# Patient Record
Sex: MALE | Race: WHITE | NOT HISPANIC OR LATINO | Employment: FULL TIME | URBAN - METROPOLITAN AREA
[De-identification: names, ages, dates, MRNs, and addresses within clinical notes are randomized per-mention and may not be internally consistent; named-entity substitution may affect disease eponyms.]

---

## 2017-10-11 ENCOUNTER — OFFICE VISIT - RIVER FALLS (OUTPATIENT)
Dept: FAMILY MEDICINE | Facility: CLINIC | Age: 42
End: 2017-10-11

## 2018-02-12 ENCOUNTER — OFFICE VISIT - RIVER FALLS (OUTPATIENT)
Dept: FAMILY MEDICINE | Facility: CLINIC | Age: 43
End: 2018-02-12

## 2018-02-12 ASSESSMENT — MIFFLIN-ST. JEOR: SCORE: 1946.52

## 2018-03-29 ENCOUNTER — OFFICE VISIT - RIVER FALLS (OUTPATIENT)
Dept: FAMILY MEDICINE | Facility: CLINIC | Age: 43
End: 2018-03-29

## 2018-03-29 ASSESSMENT — MIFFLIN-ST. JEOR: SCORE: 1949.24

## 2018-09-18 ENCOUNTER — OFFICE VISIT - RIVER FALLS (OUTPATIENT)
Dept: FAMILY MEDICINE | Facility: CLINIC | Age: 43
End: 2018-09-18

## 2018-09-18 ASSESSMENT — MIFFLIN-ST. JEOR: SCORE: 1959.22

## 2018-09-21 ENCOUNTER — AMBULATORY - RIVER FALLS (OUTPATIENT)
Dept: FAMILY MEDICINE | Facility: CLINIC | Age: 43
End: 2018-09-21

## 2018-09-22 LAB
CHOLEST SERPL-MCNC: 227 MG/DL
CHOLEST/HDLC SERPL: 5.2 {RATIO}
GLUCOSE BLD-MCNC: 94 MG/DL (ref 65–99)
HDLC SERPL-MCNC: 44 MG/DL
LDLC SERPL CALC-MCNC: 144 MG/DL
NONHDLC SERPL-MCNC: 183 MG/DL
TRIGL SERPL-MCNC: 241 MG/DL

## 2019-04-30 ENCOUNTER — OFFICE VISIT - RIVER FALLS (OUTPATIENT)
Dept: FAMILY MEDICINE | Facility: CLINIC | Age: 44
End: 2019-04-30

## 2019-04-30 ASSESSMENT — MIFFLIN-ST. JEOR: SCORE: 1952.87

## 2020-01-15 ENCOUNTER — OFFICE VISIT - RIVER FALLS (OUTPATIENT)
Dept: FAMILY MEDICINE | Facility: CLINIC | Age: 45
End: 2020-01-15

## 2020-01-15 ASSESSMENT — MIFFLIN-ST. JEOR: SCORE: 1948.33

## 2020-01-16 ENCOUNTER — COMMUNICATION - RIVER FALLS (OUTPATIENT)
Dept: FAMILY MEDICINE | Facility: CLINIC | Age: 45
End: 2020-01-16

## 2020-01-16 LAB
CHOLEST SERPL-MCNC: 229 MG/DL
CHOLEST/HDLC SERPL: 4.7 {RATIO}
GLUCOSE BLD-MCNC: 86 MG/DL (ref 65–99)
HDLC SERPL-MCNC: 49 MG/DL
LDLC SERPL CALC-MCNC: 141 MG/DL
NONHDLC SERPL-MCNC: 180 MG/DL
TRIGL SERPL-MCNC: 251 MG/DL

## 2021-02-17 ENCOUNTER — AMBULATORY - RIVER FALLS (OUTPATIENT)
Dept: FAMILY MEDICINE | Facility: CLINIC | Age: 46
End: 2021-02-17

## 2021-02-18 ENCOUNTER — COMMUNICATION - RIVER FALLS (OUTPATIENT)
Dept: FAMILY MEDICINE | Facility: CLINIC | Age: 46
End: 2021-02-18

## 2021-02-18 LAB
CHOLEST SERPL-MCNC: 183 MG/DL
CHOLEST/HDLC SERPL: 4.7 {RATIO}
GLUCOSE BLD-MCNC: 94 MG/DL (ref 65–99)
HDLC SERPL-MCNC: 39 MG/DL
LDLC SERPL CALC-MCNC: 110 MG/DL
NONHDLC SERPL-MCNC: 144 MG/DL
TRIGL SERPL-MCNC: 217 MG/DL

## 2021-02-24 ENCOUNTER — OFFICE VISIT - RIVER FALLS (OUTPATIENT)
Dept: FAMILY MEDICINE | Facility: CLINIC | Age: 46
End: 2021-02-24

## 2021-02-24 ASSESSMENT — MIFFLIN-ST. JEOR: SCORE: 1922.26

## 2021-04-21 ENCOUNTER — AMBULATORY - RIVER FALLS (OUTPATIENT)
Dept: FAMILY MEDICINE | Facility: CLINIC | Age: 46
End: 2021-04-21

## 2021-05-19 ENCOUNTER — AMBULATORY - RIVER FALLS (OUTPATIENT)
Dept: FAMILY MEDICINE | Facility: CLINIC | Age: 46
End: 2021-05-19

## 2021-07-05 ENCOUNTER — OFFICE VISIT - RIVER FALLS (OUTPATIENT)
Dept: FAMILY MEDICINE | Facility: CLINIC | Age: 46
End: 2021-07-05

## 2021-07-05 ASSESSMENT — MIFFLIN-ST. JEOR: SCORE: 1945.39

## 2021-07-09 ENCOUNTER — COMMUNICATION - RIVER FALLS (OUTPATIENT)
Dept: FAMILY MEDICINE | Facility: CLINIC | Age: 46
End: 2021-07-09

## 2021-07-09 LAB
ERYTHROCYTE [DISTWIDTH] IN BLOOD BY AUTOMATED COUNT: 12.8 % (ref 11–15)
HCT VFR BLD AUTO: 50.1 % (ref 38.5–50)
HGB BLD-MCNC: 16.3 GM/DL (ref 13.2–17.1)
IRON SATURATION: 22 (ref 20–48)
IRON SERPL-MCNC: 80 MCG/DL (ref 50–180)
MCH RBC QN AUTO: 29.2 PG (ref 27–33)
MCHC RBC AUTO-ENTMCNC: 32.5 GM/DL (ref 32–36)
MCV RBC AUTO: 89.8 FL (ref 80–100)
PLATELET # BLD AUTO: 216 10*3/UL (ref 140–400)
PMV BLD: 11.4 FL (ref 7.5–12.5)
RBC # BLD AUTO: 5.58 10*6/UL (ref 4.2–5.8)
TESTOSTERONE FREE: 64 PG/ML (ref 35–155)
TESTOSTERONE TOTAL: 284 NG/DL (ref 250–1100)
TIBC - QUEST: 370 (ref 250–425)
TSH SERPL DL<=0.005 MIU/L-ACNC: 2.57 MIU/L (ref 0.4–4.5)
WBC # BLD AUTO: 6.1 10*3/UL (ref 3.8–10.8)

## 2021-07-16 ENCOUNTER — COMMUNICATION - RIVER FALLS (OUTPATIENT)
Dept: FAMILY MEDICINE | Facility: CLINIC | Age: 46
End: 2021-07-16

## 2022-02-11 VITALS
HEART RATE: 76 BPM | TEMPERATURE: 97.4 F | BODY MASS INDEX: 31.18 KG/M2 | SYSTOLIC BLOOD PRESSURE: 128 MMHG | WEIGHT: 230.2 LBS | DIASTOLIC BLOOD PRESSURE: 84 MMHG | HEIGHT: 72 IN

## 2022-02-11 VITALS
HEART RATE: 66 BPM | HEIGHT: 72 IN | WEIGHT: 231 LBS | SYSTOLIC BLOOD PRESSURE: 124 MMHG | BODY MASS INDEX: 31.29 KG/M2 | DIASTOLIC BLOOD PRESSURE: 80 MMHG | TEMPERATURE: 97.8 F

## 2022-02-11 VITALS
BODY MASS INDEX: 31.48 KG/M2 | SYSTOLIC BLOOD PRESSURE: 122 MMHG | TEMPERATURE: 98.3 F | DIASTOLIC BLOOD PRESSURE: 80 MMHG | HEART RATE: 80 BPM | HEIGHT: 72 IN | WEIGHT: 232.4 LBS

## 2022-02-11 VITALS
BODY MASS INDEX: 29.03 KG/M2 | TEMPERATURE: 96.6 F | DIASTOLIC BLOOD PRESSURE: 74 MMHG | HEART RATE: 80 BPM | OXYGEN SATURATION: 98 % | SYSTOLIC BLOOD PRESSURE: 102 MMHG | HEIGHT: 73 IN | WEIGHT: 219 LBS

## 2022-02-11 VITALS
HEART RATE: 78 BPM | WEIGHT: 230 LBS | SYSTOLIC BLOOD PRESSURE: 122 MMHG | DIASTOLIC BLOOD PRESSURE: 70 MMHG | HEIGHT: 72 IN | BODY MASS INDEX: 31.15 KG/M2 | OXYGEN SATURATION: 96 %

## 2022-02-11 VITALS
OXYGEN SATURATION: 98 % | SYSTOLIC BLOOD PRESSURE: 104 MMHG | TEMPERATURE: 98.3 F | BODY MASS INDEX: 29.7 KG/M2 | HEART RATE: 70 BPM | WEIGHT: 224.1 LBS | HEIGHT: 73 IN | DIASTOLIC BLOOD PRESSURE: 60 MMHG

## 2022-02-11 VITALS
SYSTOLIC BLOOD PRESSURE: 114 MMHG | DIASTOLIC BLOOD PRESSURE: 82 MMHG | HEART RATE: 79 BPM | BODY MASS INDEX: 31.3 KG/M2 | OXYGEN SATURATION: 97 % | WEIGHT: 227.6 LBS

## 2022-02-11 VITALS
BODY MASS INDEX: 31.1 KG/M2 | TEMPERATURE: 97.6 F | DIASTOLIC BLOOD PRESSURE: 74 MMHG | HEART RATE: 76 BPM | WEIGHT: 229.6 LBS | SYSTOLIC BLOOD PRESSURE: 132 MMHG | HEIGHT: 72 IN

## 2022-02-15 NOTE — NURSING NOTE
CAGE Assessment Entered On:  2/24/2021 4:01 PM CST    Performed On:  2/24/2021 4:01 PM CST by Monica Jeffers CMA               Assessment   Have you ever felt you should cut down on your drinking :   No   Have people annoyed you by criticizing your drinking :   No   Have you ever felt bad or guilty about your drinking :   No   Have you ever taken a drink first thing in the morning to steady your nerves or get rid of a hangover (Eye-opener) :   No   CAGE Score :   0    Monica Jeffers CMA - 2/24/2021 4:01 PM CST

## 2022-02-15 NOTE — NURSING NOTE
CAGE Assessment Entered On:  4/30/2019 4:44 PM CDT    Performed On:  4/30/2019 4:44 PM CDT by Fadia Ray CMA               Assessment   Have you ever felt you should cut down on your drinking :   No   Have people annoyed you by criticizing your drinking :   No   Have you ever felt bad or guilty about your drinking :   No   Have you ever taken a drink first thing in the morning to steady your nerves or get rid of a hangover (Eye-opener) :   No   CAGE Score :   0    Fadia Ray CMA - 4/30/2019 4:44 PM CDT

## 2022-02-15 NOTE — PROGRESS NOTES
Patient:   JOYCE AYERS            MRN: [id number]            FIN: [id number]               Age:   41 years     Sex:  Male     :  1975   Associated Diagnoses:   Moderate recurrent major depression; Insomnia   Author:   Kaylah Ruiz MD      Chief Complaint   10/11/2017 2:47 PM CDT   Patient here for med check: Clonazepam, effexor.      Interval History   Patient here to follow up on depression. Medications are working well. Has been a little more anxious. Using clonazepam more frequently.   No side effects from medications noted. Due for refils.      Health Status   Allergies:    Allergic Reactions (All)  No known allergies   Medications:  (Selected)   Prescriptions  Prescribed  clonazePAM 1 mg oral tablet: See Instructions, Instructions: TAKE 1 TABLET BY MOUTH TWICE DAILY AS NEEDED FOR ANXIETY, # 30 tab(s), 1 Refill(s), Type: Soft Stop, Pharmacy: [facility] #[id number]  traZODone 50 mg oral tablet: See Instructions, Instructions: TAKE ONE TABLET BY MOUTH EVERY DAY, # 90 tab(s), 1 Refill(s), Type: Soft Stop, Pharmacy: [facility] #[id number]  venlafaxine 150 mg oral capsule, extended release: See Instructions, Instructions: TAKE 1 CAPSULE DAILY ALONG WITH 75MG TO TOTAL 225MG., # 90 cap(s), Type: Soft Stop, Pharmacy: [facility] #[id number]  venlafaxine 75 mg oral capsule, extended release: See Instructions, Instructions: TAKE ONE CAPSULE BY MOUTH EVERY DAY ALONG WITH 150MG TO TOTAL 225MG, # 90 cap(s), Type: Soft Stop, Pharmacy: [facility] #[id number]   Problem list:    All Problems  Tobacco abuse / ICD-9-.1 / Confirmed  Obesity / SNOMED CT 7812507514 / Probable  Moderate recurrent major depression / SNOMED CT 47329044 / Confirmed  Insomnia / SNOMED CT 688161377 / Confirmed      Histories   Past Medical History:    Active  Tobacco abuse (ICD-9-.1): Onset in the month of 3/2010 at 34 years  Insomnia (SNOMED CT 622658886)  Moderate recurrent major depression (SNOMED CT 28522859)   Family History:    CA  - Lung cancer  Grandfather (M)  Diabetes mellitus type 1  Grandfather (M)  Stroke  Grandmother (M)     Procedure history:    Rotator cuff repair ([id number]) in the month of 11/2009 at 33 Years.   Social History:        Alcohol Assessment: Current                     Comments:                      08/22/2016 - Megan Ibarra LPN                     2 x's per week      Tobacco Assessment: Current                     Comments:                      08/22/2016 - Megan Ibarra LPN                     Also, occasional regular cigarette                       09/30/2014 - Megan Ibarra LPN                     E-cigarette      Employment and Education Assessment            Employed      Home and Environment Assessment            Marital status:  (Living together).  Spouse/Partner name: Cherry.  3 children.  Living situation:               Home/Independent.      Nutrition and Health Assessment            Type of diet: Regular.      Exercise and Physical Activity Assessment: Occasional exercise      Sexual Assessment            Sexual orientation: Heterosexual.        Physical Examination   Vital Signs   10/11/2017 2:47 PM CDT Peripheral Pulse Rate 79 bpm    Systolic Blood Pressure 114 mmHg    Diastolic Blood Pressure 82 mmHg    Mean Arterial Pressure 93 mmHg    Oxygen Saturation 97 %      Measurements from flowsheet : Measurements   10/11/2017 2:47 PM CDT   Weight Measured - Standard                227.6 lb     General:  Alert and oriented, No acute distress.    Eye:  Pupils are equal, round and reactive to light, Normal conjunctiva.    HENT:  Normocephalic, Oral mucosa is moist, No pharyngeal erythema.    Neck:  Supple, Non-tender, No lymphadenopathy.    Respiratory:  Lungs are clear to auscultation.    Cardiovascular:  Normal rate, Regular rhythm.       Impression and Plan   Diagnosis     Moderate recurrent major depression (MWE01-NX F33.1).     Insomnia (OWK20-YD G47.00).     Orders     Orders (Selected)    Prescriptions  Prescribed  clonazePAM 1 mg oral tablet: See Instructions, Instructions: TAKE 1 TABLET BY MOUTH TWICE DAILY AS NEEDED FOR ANXIETY, # 30 tab(s), 2 Refill(s), Type: Soft Stop, Pharmacy: [facility] #[id number], TAKE 1 TABLET BY MOUTH TWICE DAILY AS NEEDED FOR ANXIETY  traZODone 50 mg oral tablet: 1 tab(s) ( 50 mg ), po, hs, # 90 tab(s), 3 Refill(s), Type: Soft Stop, Pharmacy: [facility] #[id number], 1 tab(s) po hs  venlafaxine 150 mg oral capsule, extended release: See Instructions, Instructions: TAKE 1 CAPSULE DAILY ALONG WITH 75MG TO TOTAL 225MG., # 90 cap(s), 3 Refill(s), Type: Soft Stop, Pharmacy: [facility] #[id number], TAKE 1 CAPSULE DAILY ALONG WITH 75MG TO TOTAL 225MG.  venlafaxine 75 mg oral capsule, extended release: See Instructions, Instructions: TAKE ONE CAPSULE BY MOUTH EVERY DAY ALONG WITH 150MG TO TOTAL 225MG, # 90 cap(s), 3 Refill(s), Type: Soft Stop, Pharmacy: [facility] #[id number], TAKE ONE CAPSULE BY MOUTH EVERY DAY ALONG WITH 150MG TO TOTAL 225MG.

## 2022-02-15 NOTE — NURSING NOTE
Comprehensive Intake Entered On:  1/15/2020 2:26 PM CST    Performed On:  1/15/2020 2:22 PM CST by Fadia Ray CMA               Summary   Chief Complaint :   Physical and Depression/Anxiety med check   Menstrual Status :   N/A   Weight Measured :   230.0 lb(Converted to: 230 lb 0 oz, 104.33 kg)    Height Measured :   71.5 in(Converted to: 5 ft 11 in, 181.61 cm)    Body Mass Index :   31.63 kg/m2 (HI)    Body Surface Area :   2.29 m2   Systolic Blood Pressure :   122 mmHg   Diastolic Blood Pressure :   70 mmHg   Mean Arterial Pressure :   87 mmHg   Peripheral Pulse Rate :   78 bpm   Oxygen Saturation :   96 %   Fadia Ray CMA - 1/15/2020 2:22 PM CST   Health Status   Allergies Verified? :   Yes   Medication History Verified? :   Yes   Immunizations Current :   Yes   Medical History Verified? :   Yes   Pre-Visit Planning Status :   Completed   Tobacco Use? :   Former smoker   Fadia Ray CMA - 1/15/2020 2:22 PM CST   Consents   Consent for Immunization Exchange :   Consent Granted   Consent for Immunizations to Providers :   Consent Granted   Fadia Ray CMA - 1/15/2020 2:22 PM CST   Meds / Allergies   (As Of: 1/15/2020 2:26:54 PM CST)   Allergies (Active)   No Known Medication Allergies  Estimated Onset Date:   Unspecified ; Created By:   Amanda Brown CMA; Reaction Status:   Active ; Category:   Drug ; Substance:   No Known Medication Allergies ; Type:   Allergy ; Updated By:   Amanda Brown CMA; Reviewed Date:   1/15/2020 2:25 PM CST        Medication List   (As Of: 1/15/2020 2:26:54 PM CST)   Prescription/Discharge Order    clonazePAM  :   clonazePAM ; Status:   Prescribed ; Ordered As Mnemonic:   clonazePAM 1 mg oral tablet ; Simple Display Line:   1 mg, 1 tab(s), Oral, bid, PRN: for anxiety, 30 tab(s), 0 Refill(s) ; Ordering Provider:   Kaylah Ruiz MD; Catalog Code:   clonazePAM ; Order Dt/Tm:   12/2/2019 4:19:35 PM CST          traZODone  :   traZODone ; Status:   Prescribed ; Ordered As Mnemonic:    traZODone 50 mg oral tablet ; Simple Display Line:   1-2 tabs, po, hs, 120 tab(s), 3 Refill(s) ; Ordering Provider:   Kaylah Ruiz MD; Catalog Code:   traZODone ; Order Dt/Tm:   4/30/2019 4:41:02 PM CDT          venlafaxine  :   venlafaxine ; Status:   Prescribed ; Ordered As Mnemonic:   venlafaxine 150 mg oral capsule, extended release ; Simple Display Line:   150 mg, 1 cap(s), Oral, daily, ALONG WITH 75MG TO TOTAL 225MG., 90 cap(s), 3 Refill(s) ; Ordering Provider:   Kaylah Ruiz MD; Catalog Code:   venlafaxine ; Order Dt/Tm:   4/30/2019 4:41:00 PM CDT          venlafaxine  :   venlafaxine ; Status:   Prescribed ; Ordered As Mnemonic:   venlafaxine 75 mg oral capsule, extended release ; Simple Display Line:   75 mg, 1 cap(s), Oral, daily, ALONG WITH 150MG TO TOTAL 225MG, 90 cap(s), 3 Refill(s) ; Ordering Provider:   Kaylah Ruiz MD; Catalog Code:   venlafaxine ; Order Dt/Tm:   4/30/2019 4:41:01 PM CDT

## 2022-02-15 NOTE — NURSING NOTE
Depression Screening Entered On:  1/15/2020 3:43 PM CST    Performed On:  1/15/2020 3:43 PM CST by Fadia Ray CMA               Depression Screening   Little Interest - Pleasure in Activities :   Not at all   Feeling Down, Depressed, Hopeless :   Not at all   Initial Depression Screen Score :   0    Trouble Falling or Staying Asleep :   Not at all   Feeling Tired or Little Energy :   Several days   Poor Appetite or Overeating :   Not at all   Feeling Bad About Yourself :   Not at all   Trouble Concentrating :   Not at all   Moving or Speaking Slowly :   Not at all   Thoughts Better Off Dead or Hurting Self :   Not at all   Detailed Depression Screen Score :   1    Total Depression Screen Score :   1    DARÍO Difficulty with Work, Home, Others :   Somewhat difficult   Fadia Ray CMA - 1/15/2020 3:43 PM CST

## 2022-02-15 NOTE — PROGRESS NOTES
Patient:   JOYCE AYERS            MRN: [id number]            FIN: [id number]               Age:   45 years     Sex:  Male     :  1975   Associated Diagnoses:   Fatigue   Author:   Kaylah Ruiz MD      Chief Complaint   2021 8:21 AM CDT     c/o fatigue.      History of Present Illness   patient with fatigue for past six weeks  has noticed decrease in energy and also sleepiness  usually worst midday but can happen at any time  no joint pain or muscle pain, no headaches, no shortness of breath or chest pain  history of low testosterone, has not been rechecked since 2015      Health Status   Allergies:    Allergic Reactions (All)  No Known Medication Allergies  Canceled/Inactive Reactions (All)  No known allergies   Medications:  (Selected)   Prescriptions  Prescribed  clonazePAM 1 mg oral tablet: = 1 tab(s) ( 1 mg ), Oral, bid, PRN: for anxiety, # 30 tab(s), 5 Refill(s), Type: Soft Stop, [place] [facility] [facility] [place], 1 tab(s) Oral bid,PRN:for anxiety, 73, in, 21 7:11:00 CST, Height Chris...  traZODone 50 mg oral tablet: = 1.5 tab(s) ( 75 mg ), Oral, qhs, # 135 tab(s), 3 Refill(s), Type: Maintenance, Pharm[place] [facility] [facility] [place], 1.5 tab(s) Oral qhs, 73, in, 21 7:11:00 CST, Height Measured, 219, lb, 21 7:...  venlafaxine 150 mg oral capsule, extended release: = 1 cap(s) ( 150 mg ), Oral, daily, Instructions: take along with 75mg daily, # 90 cap(s), 3 Refill(s), Type: Maintenance, Pharm[place] [facility] [place], 1 cap(s) Oral daily,Instr:take along with 75mg...  venlafaxine 75 mg oral capsule, extended release: = 1 cap(s), Oral, daily, # 90 cap(s), 3 Refill(s), Type: Maintenance, Pharm[place] [facility] [place] [place], 1 cap(s) Oral daily, 73, in, 21 7:11:00 CST, Height Measured, 219, lb,  02/24/21 7:11:00 CST,...   Problem list:    All Problems  Anxiety / SNOMED CT 59964573 / Confirmed  Insomnia / SNOMED CT 377876301 / Confirmed  Moderate recurrent major depression / SNOMED CT 53179922 / Confirmed  Obesity / SNOMED CT 7143274764 / Probable  Tobacco use / SNOMED CT 296402025 / Confirmed      Histories   Family History:    CA - Lung cancer  Grandfather (M)  Diabetes mellitus type 1  Grandfather (M)  Stroke  Grandmother (M)     Procedure history:    Rotator cuff repair ([id number]) in the month of 11/2009 at 33 Years.      Physical Examination   Vital Signs   7/5/2021 8:21 AM CDT Temperature Tympanic 98.3 DegF    Peripheral Pulse Rate 70 bpm    HR Method Manual    Systolic Blood Pressure 104 mmHg    Diastolic Blood Pressure 60 mmHg    Mean Arterial Pressure 75 mmHg    BP Site Right arm    BP Method Manual    Oxygen Saturation 98 %      Measurements from flowsheet : Measurements   7/5/2021 8:21 AM CDT Height Measured - Standard 73 in    Weight Measured - Standard 224.1 lb    BSA 2.29 m2    Body Mass Index 29.56 kg/m2  HI      General:  Alert and oriented, No acute distress.    Psychiatric:  Cooperative, Appropriate mood & affect.       Impression and Plan   Diagnosis     Fatigue (OBN10-PC R53.83).     Course:  doubt JAYE, cardiac, infectious. Will check labs as ordered.    Orders     Orders (Selected)   Outpatient Orders  Ordered (In Transit)  CBC (h/h, RBC, indices, WBC, Plt)* ([facility]): Specimen Type: Blood, Collection Date: 07/05/21 8:39:00 CDT  Iron, Total and Total Iron Binding Capacity* ([facility]): Specimen Type: Serum, Collection Date: 07/05/21 8:39:00 CDT  TSH* ([facility]): Specimen Type: Serum, Collection Date: 07/05/21 8:39:00 CDT  Testosterone, Free (Dialysis) and Total (LC/MS/MS)* ([facility]): Specimen Type: Serum, Collection Date: 07/05/21 8:39:00 CDT.

## 2022-02-15 NOTE — PROGRESS NOTES
Patient:   JOYCE AYERS            MRN: [id number]            FIN: [id number]               Age:   42 years     Sex:  Male     :  1975   Associated Diagnoses:   Moderate recurrent major depression; Insomnia; Injury of tendon of elbow   Author:   Kaylah Ruiz MD      Chief Complaint   3/29/2018 3:05 PM CDT    Depression med check; c/o L elbow pain x yesterday      Interval History   1. medication check, current regimen working well, using clonazepm about 3-4 times a week, no side effects noted, would like to continue current regimen  2. left elbow pain yesterday, was pulling up boat and using crank when he felt a sudden pop near his left elbow, felt pain in upper forearm and lower upper arm, tender, was swollen, a little better today, has normal ROM, worried about torn muscle or tendon       Health Status   Allergies:    Allergic Reactions (All)  No Known Medication Allergies  Canceled/Inactive Reactions (All)  No known allergies   Medications:  (Selected)   Prescriptions  Prescribed  clonazePAM 1 mg oral tablet: See Instructions, Instructions: TAKE 1 TABLET BY MOUTH TWICE DAILY AS NEEDED FOR ANXIETY, # 30 tab(s), 0 Refill(s), Type: Soft Stop, Pharmacy: [facility] #[id number], TAKE 1 TABLET BY MOUTH TWICE DAILY AS NEEDED FOR ANXIETY  traZODone 50 mg oral tablet: 1 tab(s) ( 50 mg ), po, hs, # 90 tab(s), 3 Refill(s), Type: Soft Stop, Pharmacy: [facility] #[id number], 1 tab(s) po hs  venlafaxine 150 mg oral capsule, extended release: See Instructions, Instructions: TAKE 1 CAPSULE DAILY ALONG WITH 75MG TO TOTAL 225MG., # 90 cap(s), 3 Refill(s), Type: Soft Stop, Pharmacy: [facility] #[id number], TAKE 1 CAPSULE DAILY ALONG WITH 75MG TO TOTAL 225MG.  venlafaxine 75 mg oral capsule, extended release: See Instructions, Instructions: TAKE ONE CAPSULE BY MOUTH EVERY DAY ALONG WITH 150MG TO TOTAL 225MG, # 90 cap(s), 3 Refill(s), Type: Soft Stop, Pharmacy: [facility] #[id number], TAKE ONE CAPSULE BY MOUTH EVERY DAY ALONG  WITH 150MG TO TOTAL 225MG   Problem list:    All Problems  Insomnia / SNOMED CT 084534565 / Confirmed  Moderate recurrent major depression / SNOMED CT 03468648 / Confirmed  Obesity / SNOMED CT 6615576406 / Probable  Tobacco abuse / ICD-9-.1 / Confirmed      Histories   Past Medical History:    Active  Tobacco abuse (ICD-9-.1): Onset in the month of 3/2010 at 34 years  Insomnia (SNOMED CT 753971222)  Moderate recurrent major depression (SNOMED CT 01246857)   Family History:    CA - Lung cancer  Grandfather (M)  Diabetes mellitus type 1  Grandfather (M)  Stroke  Grandmother (M)     Procedure history:    Rotator cuff repair ([id number]) in the month of 11/2009 at 33 Years.   Social History:        Alcohol Assessment: Current                     Comments:                      08/22/2016 - Megan Ibarra LPN                     2 x's per week      Tobacco Assessment: Current                     Comments:                      08/22/2016 - Megan Ibarra LPN                     Also, occasional regular cigarette                       09/30/2014 - Megan Ibarra LPN                     E-cigarette      Employment and Education Assessment            Employed      Home and Environment Assessment            Marital status:  (Living together).  Spouse/Partner name: Cherry.  3 children.  Living situation:               Home/Independent.      Nutrition and Health Assessment            Type of diet: Regular.      Exercise and Physical Activity Assessment: Occasional exercise      Sexual Assessment            Sexual orientation: Heterosexual.        Physical Examination   Vital Signs   3/29/2018 3:05 PM CDT Temperature Tympanic 97.4 DegF  LOW    Peripheral Pulse Rate 76 bpm    Pulse Site Radial artery    HR Method Manual    Systolic Blood Pressure 128 mmHg    Diastolic Blood Pressure 84 mmHg  HI    Mean Arterial Pressure 99 mmHg    BP Site Right arm    BP Method Manual      Measurements from flowsheet :  Measurements   3/29/2018 3:05 PM CDT Height Measured - Standard 71.5 in    Weight Measured - Standard 230.2 lb    BSA 2.29 m2    Body Mass Index 31.66 kg/m2  HI      Musculoskeletal:  left arm with full ROM, tender at lower bicep and near antecubital space, no swelling, no mass.       Impression and Plan   Diagnosis     Moderate recurrent major depression (QRW66-ZE F33.1).     Insomnia (PVU94-IA G47.00).     Course:  Well controlled.    Plan:  follow up when refills of clonazepam are needed.    Orders     Orders (Selected)   Prescriptions  Prescribed  clonazePAM 1 mg oral tablet: See Instructions, Instructions: TAKE 1 TABLET BY MOUTH TWICE DAILY AS NEEDED FOR ANXIETY, # 30 tab(s), 2 Refill(s), Type: Soft Stop, Pharmacy: [facility] #[id number], TAKE 1 TABLET BY MOUTH TWICE DAILY AS NEEDED FOR ANXIETY  traZODone 50 mg oral tablet: 1 tab(s) ( 50 mg ), po, hs, # 90 tab(s), 3 Refill(s), Type: Soft Stop, Pharmacy: [facility] #[id number], 1 tab(s) po hs  venlafaxine 150 mg oral capsule, extended release: See Instructions, Instructions: TAKE 1 CAPSULE DAILY ALONG WITH 75MG TO TOTAL 225MG., # 90 cap(s), 3 Refill(s), Type: Soft Stop, Pharmacy: [facility] #[id number], TAKE 1 CAPSULE DAILY ALONG WITH 75MG TO TOTAL 225MG.  venlafaxine 75 mg oral capsule, extended release: See Instructions, Instructions: TAKE ONE CAPSULE BY MOUTH EVERY DAY ALONG WITH 150MG TO TOTAL 225MG, # 90 cap(s), 3 Refill(s), Type: Soft Stop, Pharmacy: [facility] #[id number], TAKE ONE CAPSULE BY MOUTH EVERY DAY ALONG WITH 150MG TO TOTAL 225MG.     Diagnosis     Injury of tendon of elbow (KIJ93-ZF S46.909A).     Plan:  Patient will monitor symptoms. If not improving, should have MRI to rule out full rupture. Will call if not resolving..

## 2022-02-15 NOTE — TELEPHONE ENCOUNTER
---------------------  From: Shruthi Andrade MA (Phone Messages Pool ([id number]_[place] Girard))   To: Kaylah Ruiz MD;     Sent: 12/2/2019 3:33:10 PM CST  Subject: Phone Note: Clonazepam     PCP:   AG      Time of Call:  3:20       Person Calling:  Yefri  Phone number:  [phone]    Returned call at: _    Note:   Patient called wondering if you could send refills in for his Clonazepam. Last filled 4/30/19 #30 with 5 refills. Or would you like him to come in to get this refilled?    [facility]     Last office visit and reason:  4/30/19    Transferred to: [facility]  ** Submitted: **  Order:clonazePAM (clonazePAM 1 mg oral tablet)  1 tab(s)  Oral  bid  Qty:  30 tab(s)        Refills:  0          PRN  for anxiety      Route To Pharmacy - [facility] #[id number]    Signed by Kaylah Ruiz MD  12/2/2019 4:19:00 PMOK for one refill. Needs visit.---------------------  From: Kaylah Ruiz MD   To: [employer] ([id number]_JAS Girard);     Sent: 12/2/2019 4:20:37 PM CST  Subject: RE: Phone Note: ClonazepamCalled and let patient know.

## 2022-02-15 NOTE — NURSING NOTE
Generalized Anxiety Disorder Screening Entered On:  2/24/2021 4:01 PM CST    Performed On:  2/24/2021 4:01 PM CST by Monica Jeffers CMA               Generalized Anxiety Disorder Screening   DARÍO Nervous, Anxious On Edge :   Not at all   DARÍO Control Worrying B :   Not at all   DARÍO Worrying Too Much :   Not at all   DARÍO Trouble Relaxing :   Not at all   DARÍO Restless :   Not at all   DARÍO Easily Annoyed/Irritable :   Not at all   DARÍO Afraid :   Not at all   DARÍO Total Screening Score :   0    DARÍO Difficulty with Work, Home, Others :   Not difficult at all   Monica Jeffers CMA - 2/24/2021 4:01 PM CST

## 2022-02-15 NOTE — PROGRESS NOTES
Patient:   JOYCE AYERS            MRN: [id number]            FIN: [id number]               Age:   42 years     Sex:  Male     :  1975   Associated Diagnoses:   Diabetes mellitus screening; Lipid screening; Well adult exam   Author:   Kaylah Ruiz MD      Visit Information      Date of Service: 2018 01:46 pm  Performing Location: [facility]  Encounter#: [id number]      Primary Care Provider (PCP):  Kaylah Ruiz MD    NPI# [id number]      Referring Provider:  Kaylah Ruiz MD    NPI# [id number]      Chief Complaint   2018 2:00 PM CDT    Physical; had labs done for life insurance policy has questions      History of Present Illness             The patient presents for a general exam.  The patient's general health status is described as good.  The patient's diet is described as balanced.  Exercise: occasional.  Associated symptoms consist of none.  Medical encounters: none.  Compliance problems: none.  Complaint: Life insurance blood testing showed elevated triglycerdes, elevated total cholesterol. Not fasting for those labs .  Additional pertinent history: seat belt use, tobacco use tobacco use: e-cigarettes, occasional and occasional alcohol use.        Review of Systems   Constitutional:  Negative.    Eye:  Negative.    Ear/Nose/Mouth/Throat:  Tinnutis, right ear.    Respiratory:  Negative.    Cardiovascular:  Negative.    Gastrointestinal:  Negative.    Genitourinary:  Negative.    Neurologic:  Negative.    Psychiatric:  Negative.       Health Status   Allergies:    Allergic Reactions (Selected)  No Known Medication Allergies   Medications:  (Selected)   Prescriptions  Prescribed  clonazePAM 1 mg oral tablet: See Instructions, Instructions: TAKE 1 TABLET BY MOUTH TWICE DAILY AS NEEDED FOR ANXIETY, # 30 tab(s), 1 Refill(s), Type: Soft Stop, Pharmacy: [facility] #[id number]  traZODone 50 mg oral tablet: 1 tab(s) ( 50 mg ), po, hs, # 90 tab(s), 3 Refill(s), Type: Soft Stop,  Pharmacy: [facility] #[id number], 1 tab(s) po hs  venlafaxine 150 mg oral capsule, extended release: 1 cap(s) ( 150 mg ), Oral, daily, Instructions: ALONG WITH 75MG TO TOTAL 225MG., # 90 cap(s), 3 Refill(s), Type: Soft Stop, Pharmacy: [facility] #[id number]  venlafaxine 75 mg oral capsule, extended release: 1 cap(s) ( 75 mg ), Oral, daily, Instructions: ALONG WITH 150MG TO TOTAL 225MG, # 90 cap(s), 3 Refill(s), Type: Soft Stop, Pharmacy: [facility] #[id number],    Medications          *denotes recorded medication          clonazePAM 1 mg oral tablet: See Instructions, TAKE 1 TABLET BY MOUTH TWICE DAILY AS NEEDED FOR ANXIETY, 30 tab(s), 1 Refill(s).          traZODone 50 mg oral tablet: 50 mg, 1 tab(s), po, hs, 90 tab(s), 3 Refill(s).          venlafaxine 75 mg oral capsule, extended release: 75 mg, 1 cap(s), Oral, daily, ALONG WITH 150MG TO TOTAL 225MG, 90 cap(s), 3 Refill(s).          venlafaxine 150 mg oral capsule, extended release: 150 mg, 1 cap(s), Oral, daily, ALONG WITH 75MG TO TOTAL 225MG., 90 cap(s), 3 Refill(s).     Problem list:    All Problems  Insomnia / SNOMED CT 521812244 / Confirmed  Moderate recurrent major depression / SNOMED CT 88015839 / Confirmed  Obesity / SNOMED CT 0166249558 / Probable  Tobacco abuse / ICD-9-.1 / Confirmed      Histories   Past Medical History:    Active  Tobacco abuse (305.1): Onset in the month of 3/2010 at 34 years  Insomnia (786823861)  Moderate recurrent major depression (37907704)   Family History:    CA - Lung cancer  Grandfather (M)  Diabetes mellitus type 1  Grandfather (M)  Stroke  Grandmother (M)     Procedure history:    Rotator cuff repair (246370049) in the month of 11/2009 at 33 Years.   Social History:        Alcohol Assessment: Current                     Comments:                      08/22/2016 - Megan Ibarra LPN                     2 x's per week      Tobacco Assessment: Current                     Comments:                      08/22/2016 -  Stacey ASMITAMegan PERAZA                     Also, occasional regular cigarette                       09/30/2014 - Megan Ibarra LPN                     E-cigarette      Employment and Education Assessment            Employed      Home and Environment Assessment            Marital status:  (Living together).  Spouse/Partner name: Cherry.  3 children.  Living situation:               Home/Independent.      Nutrition and Health Assessment            Type of diet: Regular.      Exercise and Physical Activity Assessment: Occasional exercise      Sexual Assessment            Sexual orientation: Heterosexual.        Physical Examination   Vital Signs   9/18/2018 2:00 PM CDT Temperature Tympanic 98.3 DegF    Peripheral Pulse Rate 80 bpm    Pulse Site Radial artery    HR Method Manual    Systolic Blood Pressure 122 mmHg    Diastolic Blood Pressure 80 mmHg    Mean Arterial Pressure 94 mmHg    BP Site Left arm    BP Method Manual      Measurements from flowsheet : Measurements   9/18/2018 2:00 PM CDT Height Measured - Standard 71.5 in    Weight Measured - Standard 232.4 lb    BSA 2.3 m2    Body Mass Index 31.96 kg/m2  HI      General:  Alert and oriented, No acute distress.    Eye:  Pupils are equal, round and reactive to light, Normal conjunctiva.    Neck:  Supple.    Respiratory:  Lungs are clear to auscultation, Respirations are non-labored.    Cardiovascular:  Normal rate, Regular rhythm, No murmur.    Gastrointestinal:  Soft, Non-tender, Non-distended.    Neurologic:  Alert, Oriented.    Psychiatric:  Cooperative, Appropriate mood & affect.       Impression and Plan   Diagnosis     Diabetes mellitus screening (DLI63-FN Z13.1).     Lipid screening (PNO01-TL Z13.220).     Well adult exam (INI76-MS Z00.00).     Orders     Orders (Selected)   Outpatient Orders  Ordered  Return to Clinic (Request): RFV: fasting lab visit: lipid, glucose, Return in 1 week  Prescriptions  Prescribed  clonazePAM 1 mg oral tablet: See  Instructions, Instructions: TAKE 1 TABLET BY MOUTH TWICE DAILY AS NEEDED FOR ANXIETY, # 30 tab(s), 2 Refill(s), Type: Soft Stop, Pharmacy: [facility] #[id number], TAKE 1 TABLET BY MOUTH TWICE DAILY AS NEEDED FOR ANXIETY.     Patient was seen with student Geo Gaines MS4 and history and exam confirmed. Agree that documentation reflects findings and plan.  Kaylah uRiz MD

## 2022-02-15 NOTE — PROGRESS NOTES
Patient:   JOYCE AYERS            MRN: [id number]            FIN: [id number]               Age:   44 years     Sex:  Male     :  1975   Associated Diagnoses:   Well adult exam; Lipid screening; Screening for diabetes mellitus; Moderate recurrent major depression; Anxiety; Insomnia   Author:   Kaylah Ruiz MD      Chief Complaint   1/15/2020 2:22 PM CST    Physical and Depression/Anxiety med check      Well Adult History   Well Adult History             The patient presents for well adult exam.  The patient's general health status is described as good.  The patient's diet is described as balanced.  Exercise: occasional, had joined gym over the past couple of months but has not been often due to busy family schedule.  Additional pertinent history: no family hx of colon cancer so screening due at 49.     Also due for follow up on anxiety  Overall remains well controlled. Continues to use clonazepam as needed. Venlafaxine working well. Trazodone continues to be helpful for insomnia. No side effects noted      Review of Systems   ROS reviewed as documented in chart      Health Status   Allergies:    Allergic Reactions (All)  No Known Medication Allergies  Canceled/Inactive Reactions (All)  No known allergies   Medications:  (Selected)   Prescriptions  Prescribed  clonazePAM 1 mg oral tablet: = 1 tab(s) ( 1 mg ), Oral, bid, PRN: for anxiety, # 30 tab(s), 5 Refill(s), Type: Soft Stop, Pharmacy: [facility] , 1 tab(s) Oral bid,PRN:for anxiety  traZODone 50 mg oral tablet: = 1.5 tab(s) ( 75 mg ), po, hs, # 135 tab(s), 3 Refill(s), Type: Soft Stop, Pharmacy: [facility] , patient will let you know when needs to fill, 1.5 tab(s) Oral hs  venlafaxine 150 mg oral capsule, extended release: = 1 cap(s) ( 150 mg ), Oral, daily, Instructions: ALONG WITH 75MG TO TOTAL 225MG., # 90 cap(s), 3 Refill(s), Type: Soft Stop, Pharmacy: [facility] , patient  will let you know when needs to fill, 1 cap(s) Oral daily,Instr:ALONG...  venlafaxine 75 mg oral capsule, extended release: = 1 cap(s) ( 75 mg ), Oral, daily, Instructions: ALONG WITH 150MG TO TOTAL 225MG, # 90 cap(s), 3 Refill(s), Type: Soft Stop, Pharmacy: [facility] , patient will let you know when needs to fill, 1 cap(s) Oral daily,Instr:ALONG W...   Problem list:    All Problems  Insomnia / SNOMED CT 535840590 / Confirmed  Moderate recurrent major depression / SNOMED CT 51895244 / Confirmed  Obesity / SNOMED CT 1705613274 / Probable  Tobacco abuse / ICD-9-.1 / Confirmed      Histories   Past Medical History:    Active  Tobacco abuse (ICD-9-.1): Onset in the month of 3/2010 at 34 years  Insomnia (SNOMED CT 333256249)  Moderate recurrent major depression (SNOMED CT 91122175)   Family History:    CA - Lung cancer  Grandfather (M)  Diabetes mellitus type 1  Grandfather (M)  Stroke  Grandmother (M)     Procedure history:    Rotator cuff repair (266105744) in the month of 11/2009 at 33 Years.   Social History:        Alcohol Assessment: Current                     Comments:                      08/22/2016 - Megan Ibarra LPN                     2 x's per week      Tobacco Assessment: Current                     Comments:                      08/22/2016 - Megan Ibarra LPN                     Also, occasional regular cigarette                       09/30/2014 - Megan Ibarra LPN                     E-cigarette      Substance Abuse Assessment: Denies Substance Abuse            Never      Employment and Education Assessment            Employed      Home and Environment Assessment            Marital status:  (Living together).  Spouse/Partner name: Cherry.  3 children.  Living situation:               Home/Independent.      Nutrition and Health Assessment            Type of diet: Regular.      Exercise and Physical Activity Assessment: Regular exercise            Exercise  frequency: 3-4 times/week.      Sexual Assessment            Sexual orientation: Heterosexual.        Physical Examination   Vital Signs   1/15/2020 2:22 PM CST Peripheral Pulse Rate 78 bpm    Systolic Blood Pressure 122 mmHg    Diastolic Blood Pressure 70 mmHg    Mean Arterial Pressure 87 mmHg    Oxygen Saturation 96 %      Measurements from flowsheet : Measurements   1/15/2020 2:22 PM CST Height Measured - Standard 71.5 in    Weight Measured - Standard 230.0 lb    BSA 2.29 m2    Body Mass Index 31.63 kg/m2  HI      General:  Alert and oriented, No acute distress.    Eye:  Pupils are equal, round and reactive to light, Normal conjunctiva.    HENT:  Normocephalic, Tympanic membranes are clear, Normal hearing, No pharyngeal erythema.    Neck:  Supple, Non-tender, No lymphadenopathy, No thyromegaly.    Respiratory:  Lungs are clear to auscultation, Respirations are non-labored, Breath sounds are equal.    Cardiovascular:  Normal rate, Regular rhythm.    Gastrointestinal:  Soft, Non-tender, Non-distended, Normal bowel sounds, No organomegaly.    Musculoskeletal:  Normal range of motion, No deformity.    Integumentary:  Warm, Dry.    Neurologic:  Alert, Oriented.       Impression and Plan   Diagnosis     Well adult exam (FEU50-RF Z00.00).     Lipid screening (WRT72-GL Z13.220).     Screening for diabetes mellitus (NVM95-MG Z13.1).     Plan:  doing well, check fasting labs, no additional preventive services due at this time.    Orders     Orders (Selected)   Outpatient Orders  Ordered (In Transit)  Glucose* ([facility]): Specimen Type: Serum, Collection Date: 01/15/20 14:33:00 CST  Lipid panel with reflex to direct ldl* ([facility]): Specimen Type: Serum, Collection Date: 01/15/20 14:33:00 CST.     Diagnosis     Moderate recurrent major depression (LNM11-DN F33.1).     Anxiety (BRG34-SI F41.9).     Insomnia (TDE51-PJ G47.00).     Course:  well controlled, moods are good, sleeping well, no changes at this time, recheck in one year  or as needed.    Orders     Orders (Selected)   Prescriptions  Prescribed  clonazePAM 1 mg oral tablet: = 1 tab(s) ( 1 mg ), Oral, bid, PRN: for anxiety, # 30 tab(s), 5 Refill(s), Type: Soft Stop, Pharmacy: [facility], 1 tab(s) Oral bid,PRN:for anxiety  traZODone 50 mg oral tablet: = 1.5 tab(s) ( 75 mg ), po, hs, # 135 tab(s), 3 Refill(s), Type: Soft Stop, Pharmacy: [facility], patient will let you know when needs to fill, 1.5 tab(s) Oral hs  venlafaxine 150 mg oral capsule, extended release: = 1 cap(s) ( 150 mg ), Oral, daily, Instructions: ALONG WITH 75MG TO TOTAL 225MG., # 90 cap(s), 3 Refill(s), Type: Soft Stop, Pharmacy: [facility], patient will let you know when needs to fill, 1 cap(s) Oral daily,Instr:ALONG...  venlafaxine 75 mg oral capsule, extended release: = 1 cap(s) ( 75 mg ), Oral, daily, Instructions: ALONG WITH 150MG TO TOTAL 225MG, # 90 cap(s), 3 Refill(s), Type: Soft Stop, Pharmacy: [facility], patient will let you know when needs to fill, 1 cap(s) Oral daily,Instr:ALONG W....

## 2022-02-15 NOTE — LETTER
(Inserted Image. Unable to display)   [address], [place] [address] [phone] (phone) [phone] (fax)  July 09, 2021      JOYCE ARMEN       [address] [address]      Dear JOYCE,    Thank you for selecting [facility] for your healthcare needs. Below you will find the results of the recent tests done at our clinic.     Your lab results are in normal limits. I don't see a hormonal cause, but let me know if you want to discuss further.    Result Name Current Result Reference Range   TSH (mIU/L)  2.57 7/5/2021 0.40 - 4.50   Iron Level (mcg/dL)  80 7/5/2021 50 - 180   TIBC  370 7/5/2021 250 - 425   Iron Saturation  22 7/5/2021 20 - 48   Testosterone Total (ng/dL)  284 7/5/2021 250 - 1,100   Testosterone Free (pg/mL)  64 7/5/2021 35.0 - 155.0   WBC  6.1 7/5/2021 3.8 - 10.8   RBC  5.58 7/5/2021 4.20 - 5.80   Hgb (gm/dL)  16.3 7/5/2021 13.2 - 17.1   Hct (%) ((H)) 50.1 7/5/2021 38.5 - 50.0   MCV (fL)  89.8 7/5/2021 80.0 - 100.0   MCH (pg)  29.2 7/5/2021 27.0 - 33.0   MCHC (gm/dL)  32.5 7/5/2021 32.0 - 36.0   RDW (%)  12.8 7/5/2021 11.0 - 15.0   Platelet  216 7/5/2021 140 - 400   MPV (fL)  11.4 7/5/2021 7.5 - 12.5       Please contact me or my assistant at [phone] if you have any questions or concerns.     Sincerely,        Kaylah Ruiz M.D.

## 2022-02-15 NOTE — PROGRESS NOTES
Patient:   JOYCE AYERS            MRN: [id number]            FIN: [id number]               Age:   45 years     Sex:  Male     :  1975   Associated Diagnoses:   Screening for cardiovascular condition; Screening for diabetes mellitus; Well adult exam; Anxiety; Moderate recurrent major depression   Author:   Kaylah Ruiz MD      Chief Complaint   2021 7:11 AM CST    Annual px      Well Adult History   patient here for well check  no concerns about health  discussed family history, no significant risk factors  reviewed labs, does have history of elevated triglycerides, low HDL but overall has improved  history of anxiety, has been well controlled, current medication is effective, due for refills      Review of Systems   All other systems reviewed and negative      Health Status   Allergies:    Allergic Reactions (All)  No Known Medication Allergies  Canceled/Inactive Reactions (All)  No known allergies   Medications:  (Selected)   Prescriptions  Prescribed  clonazePAM 1 mg oral tablet: = 1 tab(s) ( 1 mg ), Oral, bid, PRN: for anxiety, # 30 tab(s), 0 Refill(s), Type: Soft Stop, Pharmacy: [facility], 1 tab(s) Oral bid,PRN:for anxiety, 71.5, in, 01/15/20 14:22:00 CST, Height M...  traZODone 50 mg oral tablet: = 1.5 tab(s), Oral, qhs, # 45 tab(s), 0 Refill(s), Type: Maintenance, Pharmacy: [facility], Pt past due for annual med check per [facility].  Must be seen for further refills., 1.5 tab(s) Oral qhs,...  venlafaxine 75 mg oral capsule, extended release: = 1 cap(s), Oral, daily, Instructions: dailyG]., # 30 cap(s), 0 Refill(s), Type: Maintenance, Pharmacy: [facility], Needs appt for further refills, 1 cap(s) Oral daily,Instr:dailyG]., 71.5, i...  Documented Medications  Documented  venlafaxine 150 mg oral capsule, extended release: =  1 cap(s) ( 150 mg ), Oral, daily, Instructions: take along with 75mg daily, # 30 cap(s), 0 Refill(s), Type: Maintenance   Problem list:    All Problems  Tobacco use / SNOMED CT 718510475 / Confirmed  Obesity / SNOMED CT 5638068489 / Probable  Moderate recurrent major depression / SNOMED CT 20603656 / Confirmed  Insomnia / SNOMED CT 337083599 / Confirmed  Anxiety / SNOMED CT 10714825 / Confirmed      Histories   Past Medical History:    Active  Tobacco use (SNOMED CT 704023340): Onset in the month of 3/2010 at 34 years  Insomnia (SNOMED CT 265329621)  Moderate recurrent major depression (SNOMED CT 05621688)  Obesity (SNOMED CT 2869068614)  Anxiety (SNOMED CT 70625719)   Family History:    CA - Lung cancer  Grandfather (M)  Diabetes mellitus type 1  Grandfather (M)  Stroke  Grandmother (M)     Procedure history:    Rotator cuff repair (957122513) in the month of 11/2009 at 33 Years.   Social History:        Electronic Cigarette/Vaping Assessment: Denies Electronic Cigarette Use            Electronic Cigarette Use: Never.      Alcohol Assessment: Current            Beer (12 oz), Wine (5 oz), 1-2 times per week, 3 drinks/episode average.  4 drinks/episode maximum.  Ready to               change: No.                     Comments:                      08/22/2016 - Megan Ibarra LPN                     2 x's per week      Tobacco Assessment: Past            Former smoker, quit more than 30 days ago      Substance Abuse Assessment: Denies Substance Abuse            Never      Employment and Education Assessment            Employed, Work/School description: .  Highest education level: Some college.      Home and Environment Assessment            Marital status:  (Living together).  Spouse/Partner name: Cherry.  3 children.  Living situation:               Home/Independent.  Injuries/Abuse/Neglect in household: No.  Feels unsafe at home: No.  Family/Friends               available for support: Yes.   Risks in environment: Unlocked guns, Pool/Fernandez, Stairs.      Nutrition and Health Assessment            Type of diet: Regular.  Wants to lose weight: No.  Sleeping concerns: No.  Feels highly stressed: No.      Exercise and Physical Activity Assessment: Occasional exercise            Exercise frequency: 1-2 times/week.  Exercise type: Gym.      Sexual Assessment            Sexually active: Yes.  Identifies as male, History of STD: No.  Contraceptive Use Details: None.  History of               sexual abuse: No.            Sexual orientation: Heterosexual.        Physical Examination   Vital Signs   2/24/2021 7:11 AM CST Temperature Tympanic 96.6 DegF  LOW    Peripheral Pulse Rate 80 bpm    Systolic Blood Pressure 102 mmHg    Diastolic Blood Pressure 74 mmHg    Mean Arterial Pressure 83 mmHg    BP Site Right arm    BP Method Manual    Oxygen Saturation 98 %      Measurements from flowsheet : Measurements   2/24/2021 7:11 AM CST Height Measured - Standard 73 in    Weight Measured - Standard 219 lb    BSA 2.26 m2    Body Mass Index 28.89 kg/m2  HI      General:  Alert and oriented, No acute distress.    Eye:  Pupils are equal, round and reactive to light, Normal conjunctiva.    HENT:  Normocephalic, Tympanic membranes are clear, Normal hearing, No pharyngeal erythema.    Neck:  Supple, Non-tender, No lymphadenopathy, No thyromegaly.    Respiratory:  Lungs are clear to auscultation, Respirations are non-labored, Breath sounds are equal.    Cardiovascular:  Normal rate, Regular rhythm.    Gastrointestinal:  Soft, Non-tender, Non-distended, Normal bowel sounds, No organomegaly.    Musculoskeletal:  Normal range of motion, No deformity.    Integumentary:  Warm, Dry.    Neurologic:  Alert, Oriented.       Health Maintenance      Recommendations     Pending (in the next year)        OverDue           Alcohol Misuse Screen due  01/15/21  and every 1  year(s)           Depression Screen (Male) due  01/15/21  and every 1   year(s)        Refused            Influenza Vaccine due  09/01/20  and every 1  year(s)        Due In Future            Lipid Disorders Screen (Male) not due until  02/17/22  and every 1  year(s)     Satisfied (in the past 1 year)        Satisfied            Body Mass Index Check on  02/24/21.           High Blood Pressure Screen (Male) on  02/24/21.           Lipid Disorders Screen (Male) on  02/17/21.           Lipid Disorders Screen (Male) on  02/17/21.           Lipid Disorders Screen (Male) on  02/17/21.           Lipid Disorders Screen (Male) on  02/17/21.           Obesity Screen and Counseling (Male) on  02/24/21.        Refused            Influenza Vaccine on  02/24/21.           Review / Management   Results review:  Lab results   2/17/2021 9:27 AM CST Glucose Level 94 mg/dL    Cholesterol 183 mg/dL    Non-  HI    HDL 39 mg/dL  LOW    Chol/HDL Ratio 4.7      HI    Triglyceride 217 mg/dL  HI   .       Impression and Plan   Diagnosis     Screening for cardiovascular condition (WJP00-QC Z13.6).     Screening for diabetes mellitus (ZDA19-EH Z13.1).     Well adult exam (XJD15-EQ Z00.00).     Orders     Orders (Selected)   Outpatient Orders  Ordered  Return to Clinic (Request): RFV: fasting labs: lipid, glucose, with annual exam one week later, Return in 1 year.     Diagnosis     Anxiety (ZMG02-FO F41.9).     Moderate recurrent major depression (MWR04-MT F33.1).     Course:  well controlled.    Orders     Orders (Selected)   Prescriptions  Prescribed  clonazePAM 1 mg oral tablet: = 1 tab(s) ( 1 mg ), Oral, bid, PRN: for anxiety, # 30 tab(s), 5 Refill(s), Type: Soft Stop, Pharmacy: [facility] [place] [facility], 1 tab(s) Oral bid,PRN:for anxiety, 73, in, 02/24/21 7:11:00 CST, Height Chris...  traZODone 50 mg oral tablet: = 1.5 tab(s) ( 75 mg ), Oral, qhs, # 135 tab(s), 3 Refill(s), Type: Maintenance, Pharmacy: [facility], 1.5 tab(s) Oral qhs, 73, in, 02/24/21 7:11:00 CST, Height Measured, 219, lb, 02/24/21 7:...  venlafaxine 150 mg oral capsule, extended release: = 1 cap(s) ( 150 mg ), Oral, daily, Instructions: take along with 75mg daily, # 90 cap(s), 3 Refill(s), Type: Maintenance, Pharmacy: [facility], 1 cap(s) Oral daily,Instr:take along with 75mg...  venlafaxine 75 mg oral capsule, extended release: = 1 cap(s), Oral, daily, # 90 cap(s), 3 Refill(s), Type: Maintenance, Pharmacy: [facility], 1 cap(s) Oral daily, 73, in, 02/24/21 7:11:00 CST, Height Measured, 219, lb, 02/24/21 7:11:00 CST,....

## 2022-02-15 NOTE — TELEPHONE ENCOUNTER
---------------------  From: Amanda Brown CMA (eRx Pool ([id number]_[place]))   To: [employer] Message "[employer] ([address]_[facility]);     Sent: 12/22/2020 1:03:42 PM CST  Subject: FW: Medication Management   Due Date/Time: 12/22/2020 4:05:00 PM CST     Notes from Pharmacy: pt is requesting a new rx due to prescription expires today 12/21 and is to early to fill, pt has about 19 days left of medicine      ------------------------------------------  From: [facility]  To: Kaylah Ruiz MD  Sent: December 21, 2020 4:05:01 PM CST  Subject: Medication Management  Due: December 16, 2020 8:25:31 PM CST     ** On Hold Pending Signature **     Drug: clonazePAM (clonazePAM 1 mg oral tablet), 1 tab(s) Oral bid,PRN:for anxiety  Quantity: 30 tab(s)  Days Supply: 0  Refills: 4  Substitutions Allowed  Notes from Pharmacy:     Dispensed Drug: clonazePAM (clonazePAM 1 mg oral tablet), TAKE ONE Tablet BY MOUTH TWICE DAILY AS NEEDED FOR ANXIETY  Quantity: 30 tab(s)  Days Supply: 30  Refills: 5  Substitutions Allowed  Notes from Pharmacy: pt is requesting a new rx due to prescription expires today 12/21 and is to early to fill, pt has about 19 days left of medicine  ---------------------------------------------------------------  From: Valencia Tran CMA ([employer] Message Pool ([id number]_[facility]))   To: Kaylah Ruiz MD;     Sent: 12/22/2020 1:11:39 PM CST  Subject: FW: Medication Management   Due Date/Time: 12/22/2020 4:05:00 PM CST---------------------  From: Kaylah Ruiz MD   To: [facility]    Sent: 12/22/2020 1:24:59 PM CST  Subject: FW: Medication Management     ** Not Approved: responded to by other means **  clonazePAM (clonazepam 1 mg tablet)  TAKE ONE Tablet  BY MOUTH TWICE DAILY AS NEEDED FOR ANXIETY  Qty:  30 tab(s)        Days Supply:  30        Refills:  5          Substitutions Allowed     Route To Pharmacy - [facility] [employer] [facility]  [facility]   Note from Pharmacy:  pt is requesting a new rx due to prescription expires today 12/21 and is to early to fill, pt has about 19 days left of medicine  ** Submitted: **  Order:clonazePAM (clonazePAM 1 mg oral tablet)  1 tab(s)  Oral  bid  Qty:  30 tab(s)        Refills:  0          PRN  for anxiety      [facility]  [place]    Signed by Kaylah Ruiz MD  12/22/2020 7:25:00 PM [facility]---------------------  From: Kaylah Ruiz MD   To: [id number] Pool ([id number]_[facility]);     Sent: 12/22/2020 1:27:03 PM CST  Subject: RE: Medication Management

## 2022-02-15 NOTE — TELEPHONE ENCOUNTER
Entered by Donna Laguna MA on February 04, 2021 11:05:29 AM CST  ---------------------  From: Donna Laguna MA   To: [facility]    Sent: 2/4/2021 11:05:29 AM CST  Subject: Medication Management     ** Submitted: **  Order:traZODone (traZODone 50 mg oral tablet)  1.5 tab(s)  Oral  qhs  Qty:  45 tab(s)        Refills:  0          Substitutions Allowed     Route To Pharmacy [facility]note 3[facility]    Signed by Donna Laguna MA  2/4/2021 5:04:00 PM [facility]    ** Submitted: **  Complete:traZODone (traZODone 50 mg oral tablet)   Signed by Donna Laguna MA  2/4/2021 5:05:00 PM UT    ** Not Approved:  **  traZODone (trazodone 50 mg tablet)  take ONE AND ONE-HALF tablets by MOUTH AT BEDTIME  Qty:  135 tab(s)        Days Supply:  90        Refills:  3          Substitutions Allowed     Route To [facility]   Signed by Donna Laguna MA            ------------------------------------------  From: [facility]  To: Kaylah Ruiz MD  Sent: February 3, 2021 1:45:15 PM CST  Subject: Medication Management  Due: January 30, 2021 4:23:19 PM CST     ** On Hold Pending Signature **     Drug: traZODone (traZODone 50 mg oral tablet), 1.5 tab(s) Oral hs  Quantity: 135 tab(s)  Days Supply: 0  Refills: 2  Substitutions Allowed  Notes from Pharmacy: patient will let you know when needs to fill     Dispensed Drug: traZODone (traZODone 50 mg oral tablet), take ONE AND ONE-HALF tablets by MOUTH AT BEDTIME  Quantity: 135 tab(s)  Days Supply: 90  Refills: 3  Substitutions Allowed  Notes from Pharmacy:  ------------------------------------------Med Refill      Date of last office visit and reason:  1/15/2020 w/ AG for px      Date of last Med Check / Px:   _  Date of last labs pertaining to med:  _    Note:  _    RTC order in chart:  RTC now due,  reminder letters sent    For Protocol refill, has patient been contacted:  message sent to pharmacy

## 2022-02-15 NOTE — LETTER
(Inserted Image. Unable to display)   April 24, 2019      JOYCE AYERS   [address] [place], [place] [id number]        Dear JOYCE,      Thank you for selecting [facility] for your healthcare needs.     Our records indicate you are due for the following services:     Medication Check    To schedule an appointment or if you have further questions, please contact your primary clinic:   [facility]          [phone]   [facility]    [phone]             [facility]         [phone]      Powered by [employer]    Sincerely,    Kaylah Ruiz M.D.

## 2022-02-15 NOTE — TELEPHONE ENCOUNTER
---------------------  From: Kaylah Ruiz MD   To: JOYCE AYERS    Sent: 2/18/2021 7:57:11 AM CST  Subject: lab results     Your lab results are listed below. Glucose (blood sugar) is normal. Triglycerides are higher than ideal but cholesterol levels are otherwise good. We'll discuss at your visit on the 24th.  Maine Ruiz      Results:  Date Result Name Ind Value Ref Range   2/17/2021 9:27 AM Glucose Level  94 mg/dL (65 - 99)   2/17/2021 9:27 AM Cholesterol  183 mg/dL ( - <200)   2/17/2021 9:27 AM Non-HDL Cholesterol ((H)) 144 ( - <130)   2/17/2021 9:27 AM HDL ((L)) 39 mg/dL (> OR = 40 - )   2/17/2021 9:27 AM Cholesterol/HDL Ratio  4.7 ( - <5.0)   2/17/2021 9:27 AM LDL ((H)) 110    2/17/2021 9:27 AM Triglyceride ((H)) 217 mg/dL ( - <150)

## 2022-02-15 NOTE — PROGRESS NOTES
Patient:   JOYCE AYERS            MRN: [id number]            FIN: [id number]               Age:   43 years     Sex:  Male     :  1975   Associated Diagnoses:   Moderate recurrent major depression; Insomnia   Author:   Kaylah Ruiz MD      Chief Complaint   2019 4:19 PM CDT    Depression/Anxiety  Med Check      History of Present Illness   here for anxiety follow up  no concerns regarding medication, working well, current regimen has been effective  due for refills      Health Status   Allergies:    Allergic Reactions (All)  No Known Medication Allergies  Canceled/Inactive Reactions (All)  No known allergies   Medications:  (Selected)   Prescriptions  Prescribed  clonazePAM 1 mg oral tablet: See Instructions, Instructions: TAKE 1 TABLET BY MOUTH TWICE DAILY AS NEEDED FOR ANXIETY, # 30 tab(s), 5 Refill(s), Type: [employer], Pharmacy: [id number], TAKE 1 TABLET BY MOUTH TWICE DAILY AS NEEDED FOR ANXIETY  traZODone 50 mg oral tablet: 1-2 tabs, po, hs, # 120 tab(s), 3 Refill(s), Type: [employer]act[employer] Stop, Pharmacy: [id number], 1-2 tabs Oral hs  venlafaxine 150 mg oral capsule, extended release: = 1 cap(s) ( 150 mg ), Oral, daily, Instructions: ALONG WITH 75MG TO TOTAL 225MG., # 90 cap(s), 3 Refill(s), Type: Soft Stop, Pharmacy: [id number], 1 cap(s) Oral daily,Instr:ALONG WITH 75MG TO TOTAL 225MG.  venlafaxine 75 mg oral capsule, extended release: = 1 cap(s) ( 75 mg ), Oral, daily, Instructions: ALONG WITH 150MG TO TOTAL 225MG, # 90 cap(s), 3 Refill(s), Type: Soft Stop, Pharmacy: [id number], 1 cap(s) Oral daily,Instr:ALONG WITH 150MG TO TOTAL 225MG   Problem list:    All Problems  Insomnia / SNOMED CT 431829379 / Confirmed  Moderate recurrent major depression / SNOMED CT 21555391 / Confirmed  Obesity / SNOMED CT 6339687490 / Probable  Tobacco abuse / ICD-9-.1 / Confirmed      Histories   Past Medical History:    Active  Tobacco abuse (ICD-9-CM  305.1): Onset in the month of 3/2010 at 34 years  Insomnia (SNOMED CT 779555773)  Moderate recurrent major depression (SNOMED CT 52072481)   Family History:    CA - Lung cancer  Grandfather (M)  Diabetes mellitus type 1  Grandfather (M)  Stroke  Grandmother (M)     Procedure history:    Rotator cuff repair ([id number]) in the month of 11/2009 at 33 Years.      Physical Examination   Vital Signs   4/30/2019 4:19 PM CDT Temperature Tympanic 97.8 DegF  LOW    Peripheral Pulse Rate 66 bpm    HR Method Manual    Systolic Blood Pressure 124 mmHg    Diastolic Blood Pressure 80 mmHg    Mean Arterial Pressure 95 mmHg    BP Method Manual      Measurements from flowsheet : Measurements   4/30/2019 4:19 PM CDT Height Measured - Standard 71.5 in    Weight Measured - Standard 231.0 lb    BSA 2.3 m2    Body Mass Index 31.77 kg/m2  HI      General:  Alert and oriented, No acute distress.    Psychiatric:  Cooperative, Appropriate mood & affect, Normal judgment, Non-suicidal.       Impression and Plan   Diagnosis     Moderate recurrent major depression (YIL70-CY F33.1).     Insomnia (HVT38-XJ G47.00).     Course:  well controlled.    Orders     Orders (Selected)   Outpatient Orders  Modify  Return to Clinic (Request): RFV: Depression med check, Return in 1 year  Prescriptions  Prescribed  clonazePAM 1 mg oral tablet: See Instructions, Instructions: TAKE 1 TABLET BY MOUTH TWICE DAILY AS NEEDED FOR ANXIETY, # 30 tab(s), 5 Refill(s), Type: Soft Stop, Pharmacy: [facility] [id number], TAKE 1 TABLET BY MOUTH TWICE DAILY AS NEEDED FOR ANXIETY  traZODone 50 mg oral tablet: 1-2 tabs, po, hs, # 120 tab(s), 3 Refill(s), Type: Soft Stop, Pharmacy: [facility] [id number], 1-2 tabs Oral hs  venlafaxine 150 mg oral capsule, extended release: = 1 cap(s) ( 150 mg ), Oral, daily, Instructions: ALONG WITH 75MG TO TOTAL 225MG., # 90 cap(s), 3 Refill(s), Type: Soft Stop, Pharmacy: [facility] [address], 1 cap(s) Oral daily,Instr:ALONG WITH  75MG TO TOTAL 225MG.  venlafaxine 75 mg oral capsule, extended release: = 1 cap(s) ( 75 mg ), Oral, daily, Instructions: ALONG WITH 150MG TO TOTAL 225MG, # 90 cap(s), 3 Refill(s), Type: Soft Stop, Pharmacy: [id number], 1 cap(s) Oral daily,Instr:ALONG WITH 150MG TO TOTAL 225MG.

## 2022-02-15 NOTE — NURSING NOTE
Comprehensive Intake Entered On:  2/24/2021 7:16 AM CST    Performed On:  2/24/2021 7:11 AM CST by Monica Jeffers CMA               Summary   Chief Complaint :   Annual px   Menstrual Status :   N/A   Weight Measured :   219 lb(Converted to: 219 lb 0 oz, 99.337 kg)    Height Measured :   73 in(Converted to: 6 ft 1 in, 185.42 cm)    Body Mass Index :   28.89 kg/m2 (HI)    Body Surface Area :   2.26 m2   Systolic Blood Pressure :   102 mmHg   Diastolic Blood Pressure :   74 mmHg   Mean Arterial Pressure :   83 mmHg   Peripheral Pulse Rate :   80 bpm   BP Site :   Right arm   BP Method :   Manual   Temperature Tympanic :   96.6 DegF(Converted to: 35.9 DegC)  (LOW)    Oxygen Saturation :   98 %   Monica Jeffers CMA - 2/24/2021 7:11 AM CST   Health Status   Allergies Verified? :   Yes   Medication History Verified? :   Yes   Immunizations Current :   Yes   Pre-Visit Planning Status :   Completed   Tobacco Use? :   Former smoker   Monica Jeffers CMA - 2/24/2021 7:11 AM CST   Consents   Consent for Immunization Exchange :   Consent Granted   Consent for Immunizations to Providers :   Consent Granted   Monica Jeffers CMA - 2/24/2021 7:11 AM CST   Meds / Allergies   (As Of: 2/24/2021 7:16:25 AM CST)   Allergies (Active)   No Known Medication Allergies  Estimated Onset Date:   Unspecified ; Created By:   Amanda Brown CMA; Reaction Status:   Active ; Category:   Drug ; Substance:   No Known Medication Allergies ; Type:   Allergy ; Updated By:   Amanda Brown CMA; Reviewed Date:   2/24/2021 7:13 AM CST        Medication List   (As Of: 2/24/2021 7:16:25 AM CST)   Prescription/Discharge Order    venlafaxine  :   venlafaxine ; Status:   Prescribed ; Ordered As Mnemonic:   venlafaxine 75 mg oral capsule, extended release ; Simple Display Line:   1 cap(s), Oral, daily, dailyG]., 30 cap(s), 0 Refill(s) ; Ordering Provider:   Kaylah Ruiz MD; Catalog Code:   venlafaxine ; Order Dt/Tm:   2/3/2021 1:24:10 PM CST          clonazePAM  :    clonazePAM ; Status:   Prescribed ; Ordered As Mnemonic:   clonazePAM 1 mg oral tablet ; Simple Display Line:   1 mg, 1 tab(s), Oral, bid, PRN: for anxiety, 30 tab(s), 0 Refill(s) ; Ordering Provider:   Kaylah Ruiz MD; Catalog Code:   clonazePAM ; Order Dt/Tm:   2/18/2021 1:57:18 PM CST          traZODone  :   traZODone ; Status:   Prescribed ; Ordered As Mnemonic:   traZODone 50 mg oral tablet ; Simple Display Line:   1.5 tab(s), Oral, qhs, 45 tab(s), 0 Refill(s) ; Ordering Provider:   Kaylah Ruiz MD; Catalog Code:   traZODone ; Order Dt/Tm:   2/4/2021 11:04:44 AM CST            Home Meds    venlafaxine  :   venlafaxine ; Status:   Documented ; Ordered As Mnemonic:   venlafaxine 150 mg oral capsule, extended release ; Simple Display Line:   150 mg, 1 cap(s), Oral, daily, take along with 75mg daily, 30 cap(s), 0 Refill(s) ; Catalog Code:   venlafaxine ; Order Dt/Tm:   2/24/2021 7:14:17 AM CST            ID Risk Screen   Recent Travel History :   No recent travel   Family Member Travel History :   No recent travel   Other Exposure to Infectious Disease :   Unknown   COVID-19 Testing Status :   Positive COVID-19 test greater than 30 days ago   Monica Jeffers CMA - 2/24/2021 7:11 AM CST   Social History   Social History   (As Of: 2/24/2021 7:16:25 AM CST)   Alcohol:  Current      Beer (12 oz), Wine (5 oz), 1-2 times per week, 3 drinks/episode average.  4 drinks/episode maximum.  Ready to change: No.   Comments:  8/22/2016 2:52 PM - Megan Ibarra LPN: 2 x's per week   (Last Updated: 1/23/2020 2:47:01 PM CST by Geraldine Nina)          Tobacco:  Past      Former smoker, quit more than 30 days ago   (Last Updated: 2/24/2021 7:13:13 AM CST by Monica Jeffers CMA)          Electronic Cigarette/Vaping:  Denies Electronic Cigarette Use      Electronic Cigarette Use: Never.   (Last Updated: 2/24/2021 7:13:01 AM CST by Monica Jeffers CMA)          Substance Abuse:  Denies Substance Abuse      Never   (Last Updated:  11/20/2018 1:59:44 PM CST by Geraldine Nina)          Employment/School:        Employed, Work/School description: .  Highest education level: Some college.   (Last Updated: 1/23/2020 2:47:29 PM CST by Geraldine Nina)          Home/Environment:        Marital status:  (Living together).  Spouse/Partner name: Cherry.  3 children.  Living situation: Home/Independent.  Injuries/Abuse/Neglect in household: No.  Feels unsafe at home: No.  Family/Friends available for support: Yes.  Risks in environment: Unlocked guns, Pool/[place], Stairs.   (Last Updated: 1/23/2020 2:48:07 PM CST by Geraldine Nina)          Nutrition/Health:        Type of diet: Regular.  Wants to lose weight: No.  Sleeping concerns: No.  Feels highly stressed: No.   (Last Updated: 1/23/2020 2:47:41 PM CST by Geraldine Nina)          Exercise:  Occasional exercise      Exercise frequency: 1-2 times/week.  Exercise type: Gym.   (Last Updated: 1/23/2020 2:48:50 PM CST by Geraldine Nina)          Sexual:        Sexual orientation: Heterosexual.   (Last Updated: 8/22/2016 10:49:17 AM CDT by Megan Ibarra LPN)   Sexually active: Yes.  Identifies as male, History of STD: No.  Contraceptive Use Details: None.  History of sexual abuse: No.   (Last Updated: 1/23/2020 2:46:21 PM CST by Geraldine Nina)

## 2022-02-15 NOTE — LETTER
(Inserted Image. Unable to display)   [address] [place] [address]  January 16, 2020      JOYCE AYERS       [address] [place], [place] [id number]      Dear JOYCE,    Thank you for selecting [facility] for your healthcare needs. Below you will find the results of the recent tests done at our clinic.     Your lab results are listed below. No overall improvement in cholesterol levels. I agree with you that taking advantage of that gym membership would be a good idea. We can recheck again in 1-2 years.     Result Name Current Result Previous Result Reference Range   Glucose Level (mg/dL)  86 1/15/2020  94 9/21/2018 65 - 99   Cholesterol (mg/dL) ((H)) 229 1/15/2020 ((H)) 227 9/21/2018  - <200   Non-HDL Cholesterol ((H)) 180 1/15/2020 ((H)) 183 9/21/2018  - <130   HDL (mg/dL)  49 1/15/2020  44 9/21/2018 >40 -    Cholesterol/HDL Ratio  4.7 1/15/2020 ((H)) 5.2 9/21/2018  - <5.0   LDL ((H)) 141 1/15/2020 ((H)) 144 9/21/2018    Triglyceride (mg/dL) ((H)) 251 1/15/2020 ((H)) 241 9/21/2018  - <150       Please contact me or my assistant at [phone] if you have any questions or concerns.     Sincerely,        Kaylah Ruiz M.D.

## 2022-02-15 NOTE — TELEPHONE ENCOUNTER
---------------------  From: Fadia Ray CMA (eRx Pool ([id number]_[place]))   To: Kaylah Ruiz MD;     Sent: 6/23/2020 4:38:26 PM CDT  Subject: FW: Medication Management   Due Date/Time: 6/24/2020 4:06:00 PM CDT           ------------------------------------------  From: [facility]  To: Kaylah Ruiz MD  Sent: June 23, 2020 4:06:04 PM CDT  Subject: Medication Management  Due: June 17, 2020 4:19:06 PM CDT     ** On Hold Pending Signature **     Drug: clonazePAM (clonazePAM 1 mg oral tablet), 1 tab(s) Oral bid,PRN:for anxiety  Quantity: 30 tab(s)  Days Supply: 0  Refills: 4  Substitutions Allowed  Notes from Pharmacy:     Dispensed Drug: clonazePAM (clonazePAM 1 mg oral tablet), TAKE ONE TABLET BY MOUTH TWICE DAILY AS NEEDED FOR ANXIETY  Quantity: 30 tab(s)  Days Supply: 15  Refills: 5  Substitutions Allowed  Notes from Pharmacy: This prescription was filled on 6/23/2020. Any refills authorized will be placed on file.  ------------------------------------------

## 2022-02-15 NOTE — NURSING NOTE
Depression Screening Entered On:  2/24/2021 4:01 PM CST    Performed On:  2/24/2021 4:01 PM CST by Monica Jeffers CMA               Depression Screening   Little Interest - Pleasure in Activities :   Not at all   Feeling Down, Depressed, Hopeless :   Not at all   Initial Depression Screen Score :   0 Score   Poor Appetite or Overeating :   Not at all   Trouble Falling or Staying Asleep :   Several days   Feeling Tired or Little Energy :   Several days   Feeling Bad About Yourself :   Not at all   Trouble Concentrating :   Not at all   Moving or Speaking Slowly :   Not at all   Thoughts Better Off Dead or Hurting Self :   Not at all   Difficulty at Work, Home, Getting Along :   Not difficult at all   Detailed Depression Screen Score :   2    Total Depression Screen Score :   2    Monica Jeffers CMA - 2/24/2021 4:01 PM CST

## 2022-02-15 NOTE — TELEPHONE ENCOUNTER
Entered by Adriana Wadsworth CMA on January 02, 2019 11:05:32 AM CST  ---------------------  From: Adriana Wadsworth CMA   To: [id number]    Sent: 1/2/2019 11:05:32 AM CST  Subject: Medication Management     ** Not Approved: Refill not appropriate, should have enough until 3/2019, next appt due **  traZODone (TRAZODONE 50 MG     TAB APOT)  TAKE 1 TABLET BY MOUTH AT BEDTIME  Qty:  90 tab(s)        Days Supply:  0        Refills:  2          BRIANNA     Route To Pharmacy - [id number]   Signed by Adriana Wadsworth CMA            ** Patient matched by Adriana Wadsworth CMA on 1/2/2019 11:03:48 AM CST **      ------------------------------------------  From: [id number]  To: Kaylah Ruiz MD  Sent: January 2, 2019 8:27:51 AM CST  Subject: Medication Management  Due: January 3, 2019 8:27:51 AM CST    ** On Hold Pending Signature **  Drug: traZODone (traZODone 50 mg oral tablet)  TAKE 1 TABLET BY MOUTH AT BEDTIME  Quantity: 90 tab(s)     Days Supply: 0         Refills: 2  Substitutions Allowed  Notes from Pharmacy:     Dispensed Drug: traZODone (traZODone 50 mg oral tablet)  TAKE 1 TABLET BY MOUTH AT BEDTIME  Quantity: 90 tab(s)     Days Supply: 0         Refills: 2  Substitutions Allowed  Notes from Pharmacy:   ------------------------------------------

## 2022-02-15 NOTE — LETTER
(Inserted Image. Unable to display)   September 18, 2019      JOYCE ARMEN   [address] [place], [place] [id number]        Dear JOYCE,      Thank you for selecting [facility] ([facility] for your healthcare needs.     Our records indicate you are due for the following services:    Annual Physical  Fasting Lab Tests ~ Please do not eat or drink anything 10 hours prior to your scheduled appointment time.                (Water and any medications that you may need are allowed unless directed otherwise.)    If you had your labs done at another facility or with [facility] at [facility], please bring in a copy of the results to your next visit, mail a copy, or drop off a copy of your results to your Healthcare Provider.    You are due for lab work and an office visit; please schedule the lab appointment 1 week before the office visit.  This will assure all results are available to discuss with your provider during your visit.    **It is very helpful if you bring your medication bottles to your appointment.  This assures we have all of your current medications, including strength and dosing information, documented accurately in your medical record.    To schedule an appointment or if you have further questions, please contact your primary clinic:   [facility]  [phone]   Critical access hospital  [phone]             [facility]      [phone]      Powered by [employer]    Sincerely,    Kaylah Ruiz M.D.

## 2022-02-15 NOTE — TELEPHONE ENCOUNTER
---------------------  From: Adriana Wadsworth CMA ([id number]   To: Kaylah Ruiz MD;     Sent: 2/28/2019 3:37:02 PM CST  Subject: phone note- trazodone     Phone Message    PCP:    AG      Time of Call:  2:26pm       Person Calling:  Yefri  Phone number:  [phone]    Returned call at: 3:33pm    Note:  Patient called asking for med refill on Trazodone. States he has one or two pills left. He is aware he is due next month for Med check. Patient unsure how he is almost out. Looks like all rx's were sent in 3/29/18 #90, 3 refills. States sometimes he does take 1.5 tabs but mostly just 1 tab. Please advise.    Yadira HERNANDEZ    Last office visit and reason:  9/18/18    Transferred to: _  ** Submitted: **  Order:traZODone (traZODone 50 mg oral tablet)  1-2 tabs  po  hs  Qty:  90 tab(s)        Refills:  0          Route To Pharmacy - [email or web address] [id number]    Signed by Kaylah Ruiz MD  2/28/2019 3:56:00 PM---------------------  From: Kaylah Ruiz MD   To: [email or web address] ([id number]_[place] [facility]);     Sent: 2/28/2019 3:56:36 PM CST  Subject: RE: phone note- trazodoneTime: 3:57pm  Note: Patient notified.

## 2022-02-15 NOTE — PROGRESS NOTES
Patient:   JOYCE AYERS            MRN: [id number]            FIN: [id number]               Age:   42 years     Sex:  Male     :  1975   Associated Diagnoses:   Recurrent maxillary sinusitis   Author:   Kristofer MCMILLAN, Shai      Report Summary   Diagnosis  Recurrent maxillary sinusitis (ECW11-MC J01.01).  Patient InstructionsOrders   Visit Information      Primary Care Provider (PCP):  Kaylah Ruiz MD    NPI# [id number]   Visit type:  New symptom.    Source of history:  Self.    Referral source:  Self.    History limitation:  None.       Chief Complaint   2018 11:17 AM CST   c/o nagging sore throat in mornings x 3 week        History of Present Illness             The patient presents with sinus problem.  The sinus problem is located in the maxillary sinus.  The sinus problem is characterized by nasal congestion, rhinorrhea and facial pain.  The severity of the sinus problem is moderate.  The sinus problem is episodic, fluctuates in intensity and is worsening.  The sinus problem has lasted for 4 week(s).  Associated symptoms consist of cough and sore throat.  CC above noted and confirmed with the patient..  No reflux. Sore throat x four weeks. Some better. Glands were swollen, not now. .        Review of Systems   Constitutional:  Negative except as documented in history of present illness.    Eye:  Negative.    Ear/Nose/Mouth/Throat:  Negative except as documented in history of present illness.    Respiratory:  Negative except as documented in history of present illness.       Health Status   Allergies:    Allergic Reactions (All)  No Known Medication Allergies  Canceled/Inactive Reactions (All)  No known allergies   Medications:  (Selected)   Prescriptions  Prescribed  Amoxil 875 mg oral tablet: 1 tab(s) ( 875 mg ), PO, BID, x 10 day(s), # 20 tab(s), 0 Refill(s), Type: Acute, Pharmacy: [facility] #[id number], 1 tab(s) po bid,x10 day(s)  clonazePAM 1 mg oral tablet: See Instructions, Instructions:  TAKE 1 TABLET BY MOUTH TWICE DAILY AS NEEDED FOR ANXIETY, # 30 tab(s), 2 Refill(s), Type: Soft Stop, Pharmacy: [facility] #[id number], TAKE 1 TABLET BY MOUTH TWICE DAILY AS NEEDED FOR ANXIETY  traZODone 50 mg oral tablet: 1 tab(s) ( 50 mg ), po, hs, # 90 tab(s), 3 Refill(s), Type: Soft Stop, Pharmacy: [facility] #[id number], 1 tab(s) po hs  venlafaxine 150 mg oral capsule, extended release: See Instructions, Instructions: TAKE 1 CAPSULE DAILY ALONG WITH 75MG TO TOTAL 225MG., # 90 cap(s), 3 Refill(s), Type: Soft Stop, Pharmacy: [facility] #[id number], TAKE 1 CAPSULE DAILY ALONG WITH 75MG TO TOTAL 225MG.  venlafaxine 75 mg oral capsule, extended release: See Instructions, Instructions: TAKE ONE CAPSULE BY MOUTH EVERY DAY ALONG WITH 150MG TO TOTAL 225MG, # 90 cap(s), 3 Refill(s), Type: Soft Stop, Pharmacy: [facility] #[id number], TAKE ONE CAPSULE BY MOUTH EVERY DAY ALONG WITH 150MG TO TOTAL 225MG   Problem list:    All Problems  Insomnia / SNOMED CT 364784072 / Confirmed  Moderate recurrent major depression / SNOMED CT 58440130 / Confirmed  Obesity / SNOMED CT 5508576654 / Probable  Tobacco abuse / ICD-9-.1 / Confirmed      Histories   Past Medical History:    Active  Tobacco abuse (305.1): Onset in the month of 3/2010 at 34 years  Insomnia (080755797)  Moderate recurrent major depression (94615291)   Family History:    CA - Lung cancer  Grandfather (M)  Diabetes mellitus type 1  Grandfather (M)  Stroke  Grandmother (M)     Procedure history:    Rotator cuff repair ([id number]) in the month of 11/2009 at 33 Years.   Social History:        Alcohol Assessment: Current                     Comments:                      08/22/2016 - Megan Ibarra LPN                     2 x's per week      Tobacco Assessment: Current                     Comments:                      08/22/2016 - Megan Ibarra LPN                     Also, occasional regular cigarette                       09/30/2014 - Megan Ibarra LPN                      E-cigarette      Employment and Education Assessment            Employed      Home and Environment Assessment            Marital status:  (Living together).  Spouse/Partner name: Cherry.  3 children.  Living situation:               Home/Independent.      Nutrition and Health Assessment            Type of diet: Regular.      Exercise and Physical Activity Assessment: Occasional exercise      Sexual Assessment            Sexual orientation: Heterosexual.        Physical Examination   Vital Signs   2/12/2018 11:17 AM CST Temperature Tympanic 97.6 DegF  LOW    Peripheral Pulse Rate 76 bpm    Pulse Site Radial artery    HR Method Manual    Systolic Blood Pressure 132 mmHg  HI    Diastolic Blood Pressure 74 mmHg    Mean Arterial Pressure 93 mmHg    BP Site Left arm    BP Method Manual      Measurements from flowsheet : Measurements   2/12/2018 11:17 AM CST Height Measured - Standard 71.5 in    Weight Measured - Standard 229.6 lb    BSA 2.29 m2    Body Mass Index 31.57 kg/m2  HI      General:  Alert and oriented, No acute distress.    Eye:  Pupils are equal, round and reactive to light, Extraocular movements are intact, Normal conjunctiva.    HENT:  Normocephalic, Tympanic membranes are clear, Oral mucosa is moist.         Nose: Both nostrils, Discharge ( Small amount, Green ).         Throat: Pharynx ( Erythematous ).    Neck:  Supple, Non-tender, No lymphadenopathy.    Respiratory:  Lungs are clear to auscultation, Respirations are non-labored.    Cardiovascular:  Normal rate, Regular rhythm, No murmur.       Impression and Plan   Diagnosis     Recurrent maxillary sinusitis (IZK93-FS J01.01).     Patient Instructions:       Counseled: Patient, Regarding diagnosis, Regarding medications, Activity, Verbalized understanding.    Orders     Orders (Selected)   Prescriptions  Prescribed  Amoxil 875 mg oral tablet: 1 tab(s) ( 875 mg ), PO, BID, x 10 day(s), # 20 tab(s), 0 Refill(s), Type: Acute, Pharmacy: [facility] #[id number], 1 tab(s) po bid,x10 day(s).     Take medicine as prescribed, side effects discussed.  Tylenol/ibuprofen for fever and discomfort.  Push fluids.  RTC if not improving in 36-48 hours, prior if concerns as we have discussed.

## 2022-02-15 NOTE — NURSING NOTE
Comprehensive Intake Entered On:  4/30/2019 4:23 PM CDT    Performed On:  4/30/2019 4:19 PM CDT by Fadia Ray CMA               Summary   Chief Complaint :   Depression/Anxiety  Med Check   Menstrual Status :   N/A   Weight Measured :   231.0 lb(Converted to: 231 lb 0 oz, 104.78 kg)    Height Measured :   71.5 in(Converted to: 5 ft 11 in, 181.61 cm)    Body Mass Index :   31.77 kg/m2 (HI)    Body Surface Area :   2.3 m2   Systolic Blood Pressure :   124 mmHg   Diastolic Blood Pressure :   80 mmHg   Mean Arterial Pressure :   95 mmHg   Peripheral Pulse Rate :   66 bpm   BP Method :   Manual   HR Method :   Manual   Temperature Tympanic :   97.8 DegF(Converted to: 36.6 DegC)  (LOW)    Fadia Ray CMA - 4/30/2019 4:19 PM CDT   Health Status   Allergies Verified? :   Yes   Medication History Verified? :   Yes   Immunizations Current :   Yes   Medical History Verified? :   Yes   Pre-Visit Planning Status :   Completed   Tobacco Use? :   Current every day smoker   Tobacco Cessation Review :   Not ready to quit   Fadia Ray CMA - 4/30/2019 4:19 PM CDT   Consents   Consent for Immunization Exchange :   Consent Granted   Consent for Immunizations to Providers :   Consent Granted   Fadia Ray CMA - 4/30/2019 4:19 PM CDT   Meds / Allergies   (As Of: 4/30/2019 4:23:26 PM CDT)   Allergies (Active)   No Known Medication Allergies  Estimated Onset Date:   Unspecified ; Created By:   Amanda Brown CMA; Reaction Status:   Active ; Category:   Drug ; Substance:   No Known Medication Allergies ; Type:   Allergy ; Updated By:   Amanda Brown CMA; Reviewed Date:   4/30/2019 4:21 PM CDT        Medication List   (As Of: 4/30/2019 4:23:26 PM CDT)   Prescription/Discharge Order    clonazePAM  :   clonazePAM ; Status:   Prescribed ; Ordered As Mnemonic:   clonazePAM 1 mg oral tablet ; Simple Display Line:   See Instructions, TAKE 1 TABLET BY MOUTH TWICE DAILY AS NEEDED FOR ANXIETY, 30 tab(s), 2 Refill(s) ; Ordering Provider:    Kalyah Ruiz MD; Catalog Code:   clonazePAM ; Order Dt/Tm:   1/15/2019 5:57:13 PM          traZODone  :   traZODone ; Status:   Prescribed ; Ordered As Mnemonic:   traZODone 50 mg oral tablet ; Simple Display Line:   1-2 tabs, po, hs, 90 tab(s), 0 Refill(s) ; Ordering Provider:   Kaylah Ruiz MD; Catalog Code:   traZODone ; Order Dt/Tm:   2/28/2019 3:56:00 PM          venlafaxine  :   venlafaxine ; Status:   Prescribed ; Ordered As Mnemonic:   venlafaxine 150 mg oral capsule, extended release ; Simple Display Line:   150 mg, 1 cap(s), Oral, daily, ALONG WITH 75MG TO TOTAL 225MG., 90 cap(s), 3 Refill(s) ; Ordering Provider:   Kaylah Ruiz MD; Catalog Code:   venlafaxine ; Order Dt/Tm:   3/29/2018 3:23:11 PM          venlafaxine  :   venlafaxine ; Status:   Prescribed ; Ordered As Mnemonic:   venlafaxine 75 mg oral capsule, extended release ; Simple Display Line:   75 mg, 1 cap(s), Oral, daily, ALONG WITH 150MG TO TOTAL 225MG, 90 cap(s), 3 Refill(s) ; Ordering Provider:   Kaylah Ruiz MD; Catalog Code:   venlafaxine ; Order Dt/Tm:   3/29/2018 3:23:09 PM

## 2022-02-15 NOTE — TELEPHONE ENCOUNTER
---------------------  From: Amanda Brown CMA (eRx Pool ([id number]_[place]))   To: [facility] Message Pool ([id number]_[facility]);     Sent: 2/18/2021 1:35:51 PM CST  Subject: FW: Medication Management   Due Date/Time: 2/19/2021 12:23:00 PM CST       last filled 12/22/20 #30, 0 refills    2/24/21 px scheduled with [facility]    ------------------------------------------  [facility]  To: Kaylah Ruiz MD  Sent: February 18, 2021 12:23:36 PM CST  Subject: Medication Management  Due: February 17, 2021 2:12:22 PM CST     ** On Hold Pending Signature **     Drug: clonazePAM (clonazePAM 1 mg oral tablet), 1 tab(s) Oral bid,PRN:for anxiety  Quantity: 30 tab(s)  Days Supply: 0  Refills: 0  Substitutions Allowed  Notes from Pharmacy:     Dispensed Drug: clonazePAM (clonazePAM 1 mg oral tablet), TAKE ONE Tablet BY MOUTH TWICE DAILY AS NEEDED FOR ANXIETY  Quantity: 30 tab(s)  Days Supply: 15  Refills: 0  Substitutions Allowed  Notes from Pharmacy:  ---------------------------------------------------------------  From: Monica Jeffers CMA ([facility] Message Pool ([id number]_[facility]))   To: Kaylah Ruiz MD;     Sent: 2/18/2021 1:40:25 PM CST  Subject: FW: Medication Management   Due Date/Time: 2/19/2021 12:23:00 PM CST---------------------  From: Kaylah Ruiz MD   To: [facility] E[facility] V[facility]    Sent: 2/18/2021 1:57:07 PM CST  Subject: FW: Medication Management     ** Not Approved: responded to by other means **  clonazePAM (clonazepam 1 mg tablet)  TAKE ONE Tablet  BY MOUTH TWICE DAILY AS NEEDED FOR ANXIETY  Qty:  30 tab(s)        Days Supply:  15        Refills:  0          Substitutions Allowed     Route To Pharmacy - [facility]  ** Submitted: **  Order:clonazePAM (clonazePAM 1 mg oral tablet)  1 tab(s)  Oral  bid  Qty:  30 tab(s)        Refills:  0          PRN  for anxiety      Route To Pharmacy -  [facility]    Signed by Kaylah Ruiz MD  2/18/2021 7:57:00 PM UTCNeeds to keep visit next week.---------------------  From: Kaylah Ruiz MD   To: [employer] Pool ([id number]_[facility]);     Sent: 2/18/2021 1:59:41 PM CST  Subject: RE: Medication Management

## 2022-02-15 NOTE — TELEPHONE ENCOUNTER
---------------------  From: Fadia Ray CMA (Phone Messages Pool ([id number] [facility])   To: Kaylah Ruiz MD;     Sent: 1/15/2019 11:28:53 AM CST  Subject: Medication Management : Clonazpam     PCP:   AG      Time of Call:  1120am       Person Calling:  Yefri  Phone number:  [phone]    Returned call at:     Note:   Patient called and stated that he needed a refill on his clonazpam. Please advise    Last office visit and reason:  9/18/18     Pharmacy: [facility]     FWD to: Tonya in #30 with 2 refills. Needs visit for next refill.---------------------  From: Kaylah Ruiz MD   To: [employer] ([id number]_[place]);     Sent: 1/15/2019 12:36:31 PM CST  Subject: RE: Medication Management : ClonazpamReturn Call    Time: 1:02pm     Note: called to inform patient of [facility] no longer has clonazepam in stock, patient asking for rx to be sent to [facility].

## 2022-02-15 NOTE — TELEPHONE ENCOUNTER
---------------------  From: Monica Jeffers CMA (eRx Pool ([id number]_[facility]))   To: Kaylah Ruiz MD;     Sent: 8/27/2021 3:24:41 PM CDT  Subject: clonazePAM refill   Due Date/Time: 8/28/2021 9:13:00 AM CDT     LR on 2/24 x 6 mo. Due for px 2/22.    Did have a visit w/ you on 7/5 for fatigue. OK to refill or do you want to see now for med check?      ------------------------------------------  From: [facility]  To: Kaylah Ruiz MD  Sent: August 27, 2021 9:13:16 AM CDT  Subject: Medication Management  Due: August 20, 2021 11:16:59 AM CDT     ** On Hold Pending Signature **     Drug: clonazePAM (clonazePAM 1 mg oral tablet), 1 tab(s) Oral bid,PRN:for anxiety  Quantity: 30 tab(s)  Days Supply: 0  Refills: 4  Substitutions Allowed  Notes from Pharmacy:     Dispensed Drug: clonazePAM (clonazePAM 1 mg oral tablet), TAKE ONE Tablet BY MOUTH TWICE DAILY AS NEEDED FOR ANXIETY  Quantity: 30 tab(s)  Days Supply: 15  Refills: 5  Substitutions Allowed  Notes from Pharmacy: This prescription was filled on 8/20/2021. Any refills authorized will be placed on file.  ---------------------------------------------------------------  From: Kaylah Ruiz MD   [facility]    Sent: 8/27/2021 7:50:43 PM CDT  Subject: clonazePAM refill     ** Not Approved: responded to by other means **  clonazePAM (clonazepam 1 mg tablet)  TAKE ONE Tablet  BY MOUTH TWICE DAILY AS NEEDED FOR ANXIETY  Qty:  30 tab(s)        Days Supply:  15        Refills:  5          Substitutions Allowed     Route To Pharmacy - [facility]   Note from Pharmacy:  This prescription was filled on 8/20/2021. Any refills authorized will be placed on file.  ** Submitted: **  Order:clonazePAM (clonazePAM 1 mg oral tablet)  1 tab(s)  Oral  bid  Qty:  30 tab(s)        Refills:  5          PRN  for anxiety      Route To Pharmacy -  [facility]    Signed by Kaylah Ruiz MD  8/28/2021 12:50:00 AM [facility] for refills given visit last month---------------------  From: Kaylah Ruiz MD   To: eRx Pool ([id number]_[place]);     Sent: 8/27/2021 7:52:06 PM CDT  Subject: RE: clonazePAM refill

## 2022-02-15 NOTE — NURSING NOTE
Comprehensive Intake Entered On:  7/5/2021 8:22 AM CDT    Performed On:  7/5/2021 8:21 AM CDT by Michelle Aquino               Summary   Chief Complaint :   c/o fatigue.    Menstrual Status :   N/A   Weight Measured :   224.1 lb(Converted to: 224 lb 2 oz, 101.650 kg)    Height Measured :   73 in(Converted to: 6 ft 1 in, 185.42 cm)    Body Mass Index :   29.56 kg/m2 (HI)    Body Surface Area :   2.29 m2   Systolic Blood Pressure :   104 mmHg   Diastolic Blood Pressure :   60 mmHg   Mean Arterial Pressure :   75 mmHg   Peripheral Pulse Rate :   70 bpm   BP Site :   Right arm   BP Method :   Manual   HR Method :   Manual   Temperature Tympanic :   98.3 DegF(Converted to: 36.8 DegC)    Oxygen Saturation :   98 %   Michelle Aquino - 7/5/2021 8:21 AM CDT   Health Status   Allergies Verified? :   Yes   Medication History Verified? :   Yes   Immunizations Current :   Yes   Medical History Verified? :   Yes   Pre-Visit Planning Status :   Completed   Michelle Aquino - 7/5/2021 8:21 AM CDT   Consents   Consent for Immunization Exchange :   Consent Granted   Consent for Immunizations to Providers :   Consent Granted   Michelle Aquino - 7/5/2021 8:21 AM CDT   Meds / Allergies   (As Of: 7/5/2021 8:22:36 AM CDT)   Allergies (Active)   No Known Medication Allergies  Estimated Onset Date:   Unspecified ; Created By:   Amanda Brown CMA; Reaction Status:   Active ; Category:   Drug ; Substance:   No Known Medication Allergies ; Type:   Allergy ; Updated By:   Amanda Brown CMA; Reviewed Date:   7/5/2021 8:22 AM CDT        Medication List   (As Of: 7/5/2021 8:22:36 AM CDT)   Prescription/Discharge Order    clonazePAM  :   clonazePAM ; Status:   Prescribed ; Ordered As Mnemonic:   clonazePAM 1 mg oral tablet ; Simple Display Line:   1 mg, 1 tab(s), Oral, bid, PRN: for anxiety, 30 tab(s), 5 Refill(s) ; Ordering Provider:   Kaylah Ruiz MD; Catalog Code:   clonazePAM ; Order Dt/Tm:    2/24/2021 8:28:48 AM CST          venlafaxine  :   venlafaxine ; Status:   Prescribed ; Ordered As Mnemonic:   venlafaxine 150 mg oral capsule, extended release ; Simple Display Line:   150 mg, 1 cap(s), Oral, daily, take along with 75mg daily, 90 cap(s), 3 Refill(s) ; Ordering Provider:   Kaylah Ruiz MD; Catalog Code:   venlafaxine ; Order Dt/Tm:   2/24/2021 7:32:10 AM CST          traZODone  :   traZODone ; Status:   Prescribed ; Ordered As Mnemonic:   traZODone 50 mg oral tablet ; Simple Display Line:   75 mg, 1.5 tab(s), Oral, qhs, 135 tab(s), 3 Refill(s) ; Ordering Provider:   Kaylah Ruiz MD; Catalog Code:   traZODone ; Order Dt/Tm:   2/24/2021 7:32:40 AM CST          venlafaxine  :   venlafaxine ; Status:   Prescribed ; Ordered As Mnemonic:   venlafaxine 75 mg oral capsule, extended release ; Simple Display Line:   1 cap(s), Oral, daily, 90 cap(s), 3 Refill(s) ; Ordering Provider:   Kaylah Ruiz MD; Catalog Code:   venlafaxine ; Order Dt/Tm:   2/24/2021 7:32:24 AM CST

## 2022-02-15 NOTE — LETTER
(Inserted Image. Unable to display)   January 15, 2021      JOYCE AYERS   [address] [place], [place] [id number]        Dear JOYCE,      Thank you for selecting [facility] ([facility]viously [facility]) for your healthcare needs.     Our records indicate you are due for the following services:    Annual Physical  Medication Check  Fasting Lab Tests ~ Please do not eat or drink anything 10 hours prior to your scheduled appointment time.                (Water and any medications that you may need are allowed unless directed otherwise.)    If you had your labs done at another facility or with Direct Access Lab Testing at [facility] beds at the [facility], please bring in a copy of the results to your next visit, mail a copy, or drop off a copy of your results to your Healthcare Provider.     You are due for lab work and an office visit; please schedule the lab appointment 1 week before the office visit.  This will assure all results are available to discuss with your Healthcare Provider during your visit.    (FYI   Regarding office visits: In some instances, a video visit or telephone visit may be offered as an option.)    **It is very helpful if you bring your medication bottles to your appointment.  This assures we have all of your current medications, including strength and dosing information, documented accurately in your medical record.    To schedule an appointment or if you have further questions, please contact your clinic at [phone].       Powered by [employer]    Sincerely,    Kaylah Ruiz M.D.

## 2022-02-15 NOTE — NURSING NOTE
Generalized Anxiety Disorder Screening Entered On:  4/30/2019 4:45 PM CDT    Performed On:  4/30/2019 4:44 PM CDT by Fadia Ray CMA               Generalized Anxiety Disorder Screening   DARÍO Nervous, Anxious On Edge :   Several days   DARÍO Control Worrying B :   Not at all   DARÍO Worrying Too Much :   Several days   DARÍO Restless :   Not at all   DARÍO Easily Annoyed/Irritable :   Not at all   DARÍO Afraid :   Not at all   DARÍO Trouble Relaxing :   Not at all   DARÍO Total Screening Score :   2    DARÍO Difficulty with Work, Home, Others :   Not difficult at all   Fadia Ray CMA - 4/30/2019 4:44 PM CDT

## 2022-02-15 NOTE — TELEPHONE ENCOUNTER
---------------------  From: Aminah Chisholm CMA (Phone Messages Pool ([id number]_[facility]))   To: Joseph MA [facility];     Sent: 7/16/2021 1:06:19 PM CDT  Subject: Phone Message, discuss labs.     Phone Message    PCP:   AG OC      Time of Call:  1240       Person Calling:  Patient  Phone number:  [phone]    Returned call at: _    Note:   Patient calls to discuss recent lab results with Dr Ruiz. Informed KWL is OC until the 26th he's says it's not urgent, but would like a call from AG at her convenience. Please advise.    Last office visit and reason:  07-05-21 fatigue KWLCalled patient. Discussed options. Will trial cutting back to venlafaxine 150mg. Encouraged activity and exercise during that time, and then will let me know if not getting better.  Can consider endocrinology evaluation if not improving.

## 2022-02-15 NOTE — TELEPHONE ENCOUNTER
Entered by Amanda Brown CMA on February 03, 2021 1:24:30 PM CST  ---------------------  From: Amanda Brown CMA   To: [facility]    Sent: 2/3/2021 1:24:30 PM CST  Subject: Medication Management     ** Submitted: **  Order:venlafaxine (venlafaxine 75 mg oral capsule, extended release)  1 cap(s)  Oral  daily  dailyG].  Qty:  30 cap(s)        Refills:  0          Substitutions Allowed     Route To [facility]    Signed by Amanda Brown CMA  2/3/2021 7:24:00 PM UT    ** Submitted: **  Complete:venlafaxine (venlafaxine 150 mg oral capsule, extended release)   Signed by Amanda Brown CMA  2/3/2021 7:24:00 PM UTC    ** Not Approved:  **  venlafaxine (venlafaxine ER 75 mg capsule,extended release 24 hr)  TAKE ONE Capsule BY MOUTH EVERY DAY with 150mg capsule TO total 225mg dailyG]  Qty:  90 cap(s)        Days Supply:  90        Refills:  3          Substitutions Allowed     Route To [facility]   Signed by Amanda Brown CMA            ** Submitted: **  Order:venlafaxine (venlafaxine 150 mg oral capsule, extended release)  1 cap(s)  Oral  daily  Qty:  30 cap(s)        Refills:  0          Substitutions Allowed     Route To [facility]    Signed by Amanda Brown CMA  2/3/2021 7:23:00 PM UTC    ** Submitted: **  Complete:venlafaxine (venlafaxine 75 mg oral capsule, extended release)   Signed by Amanda Brown CMA  2/3/2021 7:24:00 PM UTC    ** Submitted: **  Complete:venlafaxine (venlafaxine 150 mg oral capsule, extended release)   Signed by Amanda Brown CMA  2/3/2021 7:24:00 PM UTC    ** Not Approved:  **  venlafaxine (venlafaxine  mg capsule,extended release 24 hr)  TAKE ONE Capsule BY MOUTH EVERY DAY with 75mg capsule TO equal 225mg daily  Qty:  90 cap(s)         Days Supply:  90        Refills:  0          Substitutions Allowed     Route To Pharmacy - [facility] [place]   Signed by Amanda Brown CMA            ------------------------------------------  From: [facility]  To: Kaylah Ruiz MD  Sent: February 3, 2021 8:58:05 AM CST  Subject: Medication Management  Due: January 30, 2021 4:21:54 PM CST     ** On Hold Pending Signature **     Drug: venlafaxine (venlafaxine 75 mg oral capsule, extended release), 1 cap(s) Oral daily,Instr:ALONG WITH 150MG TO TOTAL 225MG  Quantity: 90 cap(s)  Days Supply: 0  Refills: 2  Substitutions Allowed  Notes from Pharmacy: patient will let you know when needs to fill     Dispensed Drug: venlafaxine (venlafaxine 75 mg oral capsule, extended release), TAKE ONE Capsule BY MOUTH EVERY DAY with 150mg capsule TO total 225mg dailyG]  Quantity: 90 cap(s)  Days Supply: 90  Refills: 3  Substitutions Allowed  Notes from Pharmacy:     ** On Hold Pending Signature **     Drug: venlafaxine (venlafaxine 150 mg oral capsule, extended release), 1 cap(s) Oral daily,Instr:ALONG WITH 75MG TO TOTAL 225MG.  Quantity: 90 cap(s)  Days Supply: 0  Refills: 2  Substitutions Allowed  Notes from Pharmacy: patient will let you know when needs to fill     Dispensed Drug: venlafaxine (venlafaxine 150 mg oral capsule, extended release), TAKE ONE Capsule BY MOUTH EVERY DAY with 75mg capsule TO equal 225mg daily  Quantity: 90 cap(s)  Days Supply: 90  Refills: 0  Substitutions Allowed  Notes from Pharmacy:  ------------------------------------------1/15/21 reminder letter sent  1/15/20 px

## 2022-03-02 NOTE — TELEPHONE ENCOUNTER
Entered by Kaylah Ruiz MD on February 25, 2022 8:05:57 AM CST  ---------------------  From: Kaylah Ruiz MD   To: [facility]    Sent: 2/25/2022 8:05:57 AM CST  Subject: Medication Management     ** Submitted: **  Order:clonazePAM (clonazePAM 1 mg oral tablet)  1 tab(s)  Oral  bid  Qty:  30 tab(s)        Days Supply:  75        Refills:  0          Substitutions Allowed     PRN  AS NEEDED FOR ANXIETY      Route To Pharmacy [facility]    Signed by Kaylah Ruiz MD  2/25/2022 2:05:00 PM [facility]    ** Submitted: **  Complete:clonazePAM (clonazePAM 1 mg oral tablet)   Signed by Kaylah Ruiz MD  2/25/2022 2:05:00 PM UT    ** Not Approved:  **  clonazePAM (clonazepam 1 mg tablet)  TAKE ONE Tablet  BY MOUTH TWICE DAILY AS NEEDED FOR ANXIETY  Qty:  30 tab(s)        Days Supply:  75        Refills:  5          Substitutions Allowed     Route To [facility]   Note from Pharmacy:  This prescription was filled on 2/16/2022. Any refills authorized will be placed on file.              ------------------------------------------  From: [facility]  To: Kaylah Ruiz MD  Sent: February 25, 2022 8:04:03 AM CST  Subject: Medication Management  Due: February 23, 2022 1:15:59 PM CST     ** On Hold Pending Signature **     Drug: clonazePAM (clonazePAM 1 mg oral tablet), 1 tab(s) Oral bid,PRN:for anxiety  Quantity: 30 tab(s)  Days Supply: 0  Refills: 4  Substitutions Allowed  Notes from Pharmacy:     Dispensed Drug: clonazePAM (clonazePAM 1 mg oral tablet), TAKE ONE Tablet BY MOUTH TWICE DAILY AS NEEDED FOR ANXIETY  Quantity: 30 tab(s)  Days Supply: 75  Refills: 5  Substitutions Allowed  Notes from Pharmacy: This prescription was filled on 2/16/2022. Any refills authorized will be placed on  file.  ------------------------------------------

## 2022-03-29 PROBLEM — F33.1 MODERATE EPISODE OF RECURRENT MAJOR DEPRESSIVE DISORDER (H): Status: ACTIVE | Noted: 2022-03-29

## 2022-03-29 PROBLEM — G47.00 INSOMNIA: Status: ACTIVE | Noted: 2022-03-29

## 2022-03-29 PROBLEM — F41.9 ANXIETY: Status: ACTIVE | Noted: 2022-03-29

## 2022-03-29 PROBLEM — E66.9 OBESITY: Status: ACTIVE | Noted: 2022-03-29

## 2022-03-29 RX ORDER — CLONAZEPAM 1 MG/1
1 TABLET ORAL 2 TIMES DAILY PRN
COMMUNITY
Start: 2021-08-27 | End: 2022-03-30

## 2022-03-30 ENCOUNTER — OFFICE VISIT (OUTPATIENT)
Dept: FAMILY MEDICINE | Facility: CLINIC | Age: 47
End: 2022-03-30
Payer: COMMERCIAL

## 2022-03-30 VITALS
HEIGHT: 72 IN | SYSTOLIC BLOOD PRESSURE: 111 MMHG | TEMPERATURE: 97.4 F | DIASTOLIC BLOOD PRESSURE: 76 MMHG | WEIGHT: 229.9 LBS | HEART RATE: 77 BPM | OXYGEN SATURATION: 99 % | BODY MASS INDEX: 31.14 KG/M2

## 2022-03-30 DIAGNOSIS — G89.29 CHRONIC RIGHT SACROILIAC PAIN: ICD-10-CM

## 2022-03-30 DIAGNOSIS — F33.1 MODERATE EPISODE OF RECURRENT MAJOR DEPRESSIVE DISORDER (H): ICD-10-CM

## 2022-03-30 DIAGNOSIS — F51.01 PRIMARY INSOMNIA: ICD-10-CM

## 2022-03-30 DIAGNOSIS — M54.50 CHRONIC LOW BACK PAIN WITHOUT SCIATICA, UNSPECIFIED BACK PAIN LATERALITY: Primary | ICD-10-CM

## 2022-03-30 DIAGNOSIS — M53.3 CHRONIC RIGHT SACROILIAC PAIN: ICD-10-CM

## 2022-03-30 DIAGNOSIS — F41.1 GENERALIZED ANXIETY DISORDER: ICD-10-CM

## 2022-03-30 DIAGNOSIS — G89.29 CHRONIC LOW BACK PAIN WITHOUT SCIATICA, UNSPECIFIED BACK PAIN LATERALITY: Primary | ICD-10-CM

## 2022-03-30 PROCEDURE — 99214 OFFICE O/P EST MOD 30 MIN: CPT | Performed by: FAMILY MEDICINE

## 2022-03-30 RX ORDER — VENLAFAXINE HYDROCHLORIDE 150 MG/1
150 CAPSULE, EXTENDED RELEASE ORAL DAILY
Qty: 90 CAPSULE | Refills: 1 | Status: SHIPPED | OUTPATIENT
Start: 2022-03-30 | End: 2023-01-12

## 2022-03-30 RX ORDER — VENLAFAXINE HYDROCHLORIDE 75 MG/1
75 CAPSULE, EXTENDED RELEASE ORAL DAILY
Qty: 90 CAPSULE | Refills: 1 | Status: SHIPPED | OUTPATIENT
Start: 2022-03-30 | End: 2023-01-12

## 2022-03-30 RX ORDER — CLONAZEPAM 1 MG/1
1 TABLET ORAL 2 TIMES DAILY PRN
Qty: 60 TABLET | Refills: 5 | Status: SHIPPED | OUTPATIENT
Start: 2022-03-30 | End: 2022-10-03

## 2022-03-30 RX ORDER — TRAZODONE HYDROCHLORIDE 50 MG/1
75 TABLET, FILM COATED ORAL AT BEDTIME
Qty: 135 TABLET | Refills: 1 | Status: SHIPPED | OUTPATIENT
Start: 2022-03-30 | End: 2023-01-12

## 2022-03-30 ASSESSMENT — PAIN SCALES - GENERAL: PAINLEVEL: MILD PAIN (2)

## 2022-03-30 NOTE — PROGRESS NOTES
Assessment & Plan     Acute on Chronic right sacroiliac pain  Due to the point tenderness and increased pain with right lateral bending of the spine, acute on chronic right sacroiliac pain is the most likely diagnosis. Imaging is unlikely to be useful at this time as there are no red flag symptoms or history of recent trauma, so imaging is only likely to show non-specific chronic changes that would not . Treatment with physical therapy and/or muscle relaxant medications could offer some relief of the pain with time. However, the patient is preferable to a spine referral for consideration of management of acute and chronic back pain.   - Spine Referral; Future    Chronic low back pain without sciatica, unspecified back pain laterality  The patient does not have acute concerns with this, but does report continued chronic back pain without symptoms of radiculopathy or sciatica. Discussed options and minimal benefit that is likely to be gained from imaging at this time due to the chronic nature of his pain and lack of recent trauma or red flag symptoms.   - Spine Referral; Future    Moderate episode of recurrent major depressive disorder (H)  Medications refilled, stable on venlafaxine    Primary insomnia  Medications refilled, trazodone overall effective    Generalized anxiety disorder  Medications refilled, continues prn clonazepam      Prescription drug management  30 minutes spent on the date of the encounter doing chart review, history and exam, documentation and further activities per the note.       BMI:   Estimated body mass index is 31.18 kg/m  as calculated from the following:    Height as of this encounter: 1.829 m (6').    Weight as of this encounter: 104.3 kg (229 lb 14.4 oz).     CONSULTATION/REFERRAL to Spine specialist    Return in about 6 months (around 9/30/2022).    Kaylah Ruiz MD  [facility]    Hiral Asif is a 46 year old with a ~20 year  history of chronic back pain who presents to clinic today with a 6 month history of increasingly frequent brief episodes of stabbing pain in his right lower back.     He reports that he has had this pain in the past, but it was always very infrequent, until about 6 months ago when it began happening on a weekly basis. The pain now occurs 2-3 times per week, and he can feel a sensation of the pain starting when he leans to the right. The pain is localized to his right sacroiliac joint and does radiate. He denies trauma prior to the increase in this pain. It improves only minimally with ice, heat, ibuprofen, and tylenol. He tried going to the chiropractor, but this worsened the pain, so he has stopped going When the pain occurs it lasts about 10 seconds, and causes his legs to become weak due to intensity. Outside of these episodes he has no leg weakness, paraesthesias, radiculopathy, or change in sensation.    History of Present Illness       Back Pain:  He presents for follow up of back pain. Patient's back pain is a chronic problem.  Location of back pain:  Right lower back  Description of back pain: sharp and stabbing  Back pain spreads: nowhere    Since patient first noticed back pain, pain is: gradually worsening  Does back pain interfere with his job:  No      He eats 0-1 servings of fruits and vegetables daily.He consumes 0 sweetened beverage(s) daily.He exercises with enough effort to increase his heart rate 10 to 19 minutes per day.  He exercises with enough effort to increase his heart rate 3 or less days per week.   He is taking medications regularly.      Chronic/Recurring Back Pain Follow Up      Where is your back pain located? (Select all that apply) low back right    How would you describe your back pain?  sharp, shooting and stabbing    Where does your back pain spread? nowhere    Since your last clinic visit for back pain, how has your pain changed? gradually worsening    Does your back pain  interfere with your job? YES    Since your last visit, have you tried any new treatment? Yes -  chiropractor    Review of Systems   CONSTITUTIONAL:NEGATIVE for chills and fever   INTEGUMENTARY/SKIN: NEGATIVE for rash  GI: NEGATIVE for bowel incontinence   : NEGATIVE for bladder incontinence  MUSCULOSKELETAL: POSITIVE  for back pain and NEGATIVE for recent injury to the lower back, joint swelling, joint warmth, paresthesias,l and radicular pain  NEURO: NEGATIVE for numbness or tingling and prolonged weakness of the lower limbs      Objective    /76 (BP Location: Right arm)   Pulse 77   Temp 97.4  F (36.3  C) (Tympanic)   Ht 1.829 m (6')   Wt 104.3 kg (229 lb 14.4 oz)   SpO2 99%   BMI 31.18 kg/m    Body mass index is 31.18 kg/m .  Physical Exam   GENERAL: healthy, alert and no distress  MS: normal muscle tone of the lower extremities, decreased range of motion on right lateral bending of the spine d/t pain, no lower extremity edema, tenderness to palpation of the right sacroiliac joint, spine exam shows no tenderness to palpation of the spinous processes or paraspinal muscles  SKIN: no suspicious lesions or rashes  NEURO: Normal strength and tone, sensory exam grossly normal in the bilateral lower extremities  PSYCH: mentation appears normal, affect normal/bright      Physician Attestation   I, Kaylah Ruiz, was present with the medical/SABA student who participated in the service and in the documentation of the note.  I have verified the history and personally performed the physical exam and medical decision making.  I agree with the assessment and plan of care as documented in the note.          Kaylah Ruiz MD

## 2022-04-20 ENCOUNTER — MEDICAL CORRESPONDENCE (OUTPATIENT)
Dept: HEALTH INFORMATION MANAGEMENT | Facility: CLINIC | Age: 47
End: 2022-04-20
Payer: COMMERCIAL

## 2022-04-21 ENCOUNTER — TRANSFERRED RECORDS (OUTPATIENT)
Dept: HEALTH INFORMATION MANAGEMENT | Facility: CLINIC | Age: 47
End: 2022-04-21

## 2022-04-21 ENCOUNTER — ANCILLARY PROCEDURE (OUTPATIENT)
Dept: GENERAL RADIOLOGY | Facility: CLINIC | Age: 47
End: 2022-04-21
Attending: PHYSICAL MEDICINE & REHABILITATION
Payer: COMMERCIAL

## 2022-04-21 DIAGNOSIS — M54.50 LOW BACK PAIN: ICD-10-CM

## 2022-05-16 ENCOUNTER — TELEPHONE (OUTPATIENT)
Dept: FAMILY MEDICINE | Facility: CLINIC | Age: 47
End: 2022-05-16
Payer: COMMERCIAL

## 2022-05-16 DIAGNOSIS — G89.29 CHRONIC LOW BACK PAIN WITHOUT SCIATICA, UNSPECIFIED BACK PAIN LATERALITY: Primary | ICD-10-CM

## 2022-05-16 DIAGNOSIS — M54.50 CHRONIC LOW BACK PAIN WITHOUT SCIATICA, UNSPECIFIED BACK PAIN LATERALITY: Primary | ICD-10-CM

## 2022-05-16 RX ORDER — CYCLOBENZAPRINE HCL 10 MG
10 TABLET ORAL 3 TIMES DAILY PRN
Qty: 30 TABLET | Refills: 1 | Status: SHIPPED | OUTPATIENT
Start: 2022-05-16 | End: 2022-06-27

## 2022-05-16 NOTE — TELEPHONE ENCOUNTER
Reason for Call:  Other prescription    Detailed comments: Would like something for back pain.    Phone Number Patient can be reached at: Cell number on file:    Telephone Information:   Mobile [phone]       Best Time: anytime    Can we leave a detailed message on this number? YES    Call taken on 5/16/2022 at 11:44 AM by JP CHAUHAN

## 2022-05-16 NOTE — TELEPHONE ENCOUNTER
I sent in a muscle relaxer. If that isn't effective, he needs a visit. Stronger pain medications are contraindicated with his clonazepam so would need to discuss options. Could do a course of steroids if he thinks that might help.

## 2022-05-16 NOTE — TELEPHONE ENCOUNTER
Tc with pt, who stated he has had back pain for 20yrs. Stated he is waiting to get an MRI scheduled for his back per Dr. Callejas. Pt stated he has tried tylenol and advil and does not help. Stated the pain is worse right now. Pt is requesting [facility] to prescribe a medication for back pain.     Please advise on request for pain med.  3/30/33 OV acute on chronic back pain [facility]; referral to spine

## 2022-05-29 ENCOUNTER — HEALTH MAINTENANCE LETTER (OUTPATIENT)
Age: 47
End: 2022-05-29

## 2022-06-02 ENCOUNTER — TRANSFERRED RECORDS (OUTPATIENT)
Dept: HEALTH INFORMATION MANAGEMENT | Facility: CLINIC | Age: 47
End: 2022-06-02
Payer: COMMERCIAL

## 2022-06-25 DIAGNOSIS — G89.29 CHRONIC LOW BACK PAIN WITHOUT SCIATICA, UNSPECIFIED BACK PAIN LATERALITY: ICD-10-CM

## 2022-06-25 DIAGNOSIS — M54.50 CHRONIC LOW BACK PAIN WITHOUT SCIATICA, UNSPECIFIED BACK PAIN LATERALITY: ICD-10-CM

## 2022-06-27 RX ORDER — CYCLOBENZAPRINE HCL 10 MG
10 TABLET ORAL 3 TIMES DAILY PRN
Qty: 30 TABLET | Refills: 1 | Status: SHIPPED | OUTPATIENT
Start: 2022-06-27 | End: 2023-01-12

## 2022-06-27 NOTE — TELEPHONE ENCOUNTER
Routing refill request to provider for review/approval because:  Drug not on the [facility] refill protocol     Last Written Prescription Date:  5/16/2022  Last Fill Quantity: 30,  # refills: 1   Last office visit provider:  3/30/2022     Requested Prescriptions   Pending Prescriptions Disp Refills     cyclobenzaprine (FLEXERIL) 10 MG tablet [Pharmacy Med Name: cyclobenzaprine 10 mg tablet] 30 tablet 1     Sig: Take 1 tablet (10 mg) by mouth 3 times daily as needed for muscle spasms       There is no refill protocol information for this order          Andre Flores RN 06/27/22 7:38 AM

## 2022-06-30 ENCOUNTER — TRANSFERRED RECORDS (OUTPATIENT)
Dept: HEALTH INFORMATION MANAGEMENT | Facility: CLINIC | Age: 47
End: 2022-06-30

## 2022-07-02 ENCOUNTER — HOSPITAL ENCOUNTER (EMERGENCY)
Facility: OTHER | Age: 47
Discharge: HOME | End: 2022-07-03
Payer: COMMERCIAL

## 2022-07-02 VITALS
OXYGEN SATURATION: 95 % | BODY MASS INDEX: 29.16 KG/M2 | WEIGHT: 220 LBS | HEIGHT: 73 IN | SYSTOLIC BLOOD PRESSURE: 114 MMHG | DIASTOLIC BLOOD PRESSURE: 73 MMHG | HEART RATE: 101 BPM | RESPIRATION RATE: 15 BRPM | TEMPERATURE: 97.5 F

## 2022-07-03 NOTE — ED TRIAGE NOTES
"ED Nursing Triage Note (General)   ________________________________    Yefri Vincent is a 46 year old Male that presents to triage private car  With history of  Tripping over a dog and cutting his left foot on a metal campfire ring reported by patient   Significant symptoms had onset **4* hour(s) ago.  /73   Pulse 101   Temp 97.5  F (36.4  C) (Tympanic)   Resp 15   Ht 1.854 m (6' 1\")   Wt 99.8 kg (220 lb)   SpO2 95%   BMI 29.03 kg/m  t  Patient appears alert , in mild distress., and cooperative behavior.    GCS Total = 15  Airway: intact  Breathing noted as Normal  Circulation Normal  Skin:  Normal  Action taken: to waiting room      PRE HOSPITAL PRIOR LIVING SITUATION OOT     Triage Assessment     Row Name 07/02/22 [id number]       Triage Assessment (Adult)    Airway WDL WDL       Respiratory WDL    Respiratory WDL WDL       Skin Circulation/Temperature WDL    Skin Circulation/Temperature WDL WDL       Cardiac WDL    Cardiac WDL WDL       Peripheral/Neurovascular WDL    Peripheral Neurovascular WDL WDL       Cognitive/Neuro/Behavioral WDL    Cognitive/Neuro/Behavioral WDL WDL              "

## 2022-07-05 ENCOUNTER — ALLIED HEALTH/NURSE VISIT (OUTPATIENT)
Dept: FAMILY MEDICINE | Facility: CLINIC | Age: 47
End: 2022-07-05
Payer: COMMERCIAL

## 2022-07-05 DIAGNOSIS — Z23 NEED FOR VACCINATION: Primary | ICD-10-CM

## 2022-07-05 PROCEDURE — 90715 TDAP VACCINE 7 YRS/> IM: CPT

## 2022-07-05 PROCEDURE — 99207 PR NO CHARGE NURSE ONLY: CPT

## 2022-07-05 PROCEDURE — 90471 IMMUNIZATION ADMIN: CPT

## 2022-10-03 ENCOUNTER — HEALTH MAINTENANCE LETTER (OUTPATIENT)
Age: 47
End: 2022-10-03

## 2022-10-03 DIAGNOSIS — F41.1 GENERALIZED ANXIETY DISORDER: ICD-10-CM

## 2022-10-03 RX ORDER — CLONAZEPAM 1 MG/1
1 TABLET ORAL 2 TIMES DAILY PRN
Qty: 60 TABLET | Refills: 0 | Status: SHIPPED | OUTPATIENT
Start: 2022-10-03 | End: 2023-01-12

## 2022-10-19 NOTE — TELEPHONE ENCOUNTER
I called and spoke with patient, he will call back to schedule med check. I verbalized understanding. Encounter closed.

## 2022-10-31 ENCOUNTER — TRANSFERRED RECORDS (OUTPATIENT)
Dept: HEALTH INFORMATION MANAGEMENT | Facility: CLINIC | Age: 47
End: 2022-10-31

## 2022-11-16 ENCOUNTER — TRANSFERRED RECORDS (OUTPATIENT)
Dept: HEALTH INFORMATION MANAGEMENT | Facility: CLINIC | Age: 47
End: 2022-11-16

## 2022-12-19 ENCOUNTER — TRANSFERRED RECORDS (OUTPATIENT)
Dept: HEALTH INFORMATION MANAGEMENT | Facility: CLINIC | Age: 47
End: 2022-12-19

## 2023-01-12 ENCOUNTER — OFFICE VISIT (OUTPATIENT)
Dept: FAMILY MEDICINE | Facility: CLINIC | Age: 48
End: 2023-01-12
Payer: COMMERCIAL

## 2023-01-12 VITALS
OXYGEN SATURATION: 98 % | WEIGHT: 234.8 LBS | SYSTOLIC BLOOD PRESSURE: 114 MMHG | RESPIRATION RATE: 14 BRPM | DIASTOLIC BLOOD PRESSURE: 68 MMHG | HEART RATE: 78 BPM | BODY MASS INDEX: 30.98 KG/M2

## 2023-01-12 DIAGNOSIS — G89.29 CHRONIC LOW BACK PAIN, UNSPECIFIED BACK PAIN LATERALITY, UNSPECIFIED WHETHER SCIATICA PRESENT: Primary | ICD-10-CM

## 2023-01-12 DIAGNOSIS — Z79.899 ENCOUNTER FOR LONG-TERM (CURRENT) USE OF MEDICATIONS: ICD-10-CM

## 2023-01-12 DIAGNOSIS — M54.50 CHRONIC LOW BACK PAIN, UNSPECIFIED BACK PAIN LATERALITY, UNSPECIFIED WHETHER SCIATICA PRESENT: Primary | ICD-10-CM

## 2023-01-12 DIAGNOSIS — F41.1 GENERALIZED ANXIETY DISORDER: ICD-10-CM

## 2023-01-12 DIAGNOSIS — Z12.11 SCREEN FOR COLON CANCER: ICD-10-CM

## 2023-01-12 DIAGNOSIS — F33.40 RECURRENT MAJOR DEPRESSIVE DISORDER, IN REMISSION (H): ICD-10-CM

## 2023-01-12 DIAGNOSIS — F51.01 PRIMARY INSOMNIA: ICD-10-CM

## 2023-01-12 LAB
ANION GAP SERPL CALCULATED.3IONS-SCNC: 12 MMOL/L (ref 7–15)
BUN SERPL-MCNC: 19.3 MG/DL (ref 6–20)
CALCIUM SERPL-MCNC: 9.6 MG/DL (ref 8.6–10)
CHLORIDE SERPL-SCNC: 102 MMOL/L (ref 98–107)
CREAT SERPL-MCNC: 1.11 MG/DL (ref 0.67–1.17)
DEPRECATED HCO3 PLAS-SCNC: 23 MMOL/L (ref 22–29)
GFR SERPL CREATININE-BSD FRML MDRD: 82 ML/MIN/1.73M2
GLUCOSE SERPL-MCNC: 86 MG/DL (ref 70–99)
POTASSIUM SERPL-SCNC: 4.6 MMOL/L (ref 3.4–5.3)
SODIUM SERPL-SCNC: 137 MMOL/L (ref 136–145)

## 2023-01-12 PROCEDURE — 99214 OFFICE O/P EST MOD 30 MIN: CPT | Performed by: FAMILY MEDICINE

## 2023-01-12 PROCEDURE — 36415 COLL VENOUS BLD VENIPUNCTURE: CPT | Performed by: FAMILY MEDICINE

## 2023-01-12 PROCEDURE — 80048 BASIC METABOLIC PNL TOTAL CA: CPT | Performed by: FAMILY MEDICINE

## 2023-01-12 RX ORDER — VENLAFAXINE HYDROCHLORIDE 150 MG/1
150 CAPSULE, EXTENDED RELEASE ORAL DAILY
Qty: 90 CAPSULE | Refills: 3 | Status: SHIPPED | OUTPATIENT
Start: 2023-01-12 | End: 2023-08-07

## 2023-01-12 RX ORDER — CELECOXIB 100 MG/1
100 CAPSULE ORAL 2 TIMES DAILY
Qty: 90 CAPSULE | Refills: 3 | Status: SHIPPED | OUTPATIENT
Start: 2023-01-12 | End: 2023-07-10

## 2023-01-12 RX ORDER — CLONAZEPAM 1 MG/1
1 TABLET ORAL 2 TIMES DAILY PRN
Qty: 60 TABLET | Refills: 5 | Status: SHIPPED | OUTPATIENT
Start: 2023-01-12 | End: 2023-07-10

## 2023-01-12 RX ORDER — VENLAFAXINE HYDROCHLORIDE 75 MG/1
75 CAPSULE, EXTENDED RELEASE ORAL DAILY
Qty: 90 CAPSULE | Refills: 1 | Status: SHIPPED | OUTPATIENT
Start: 2023-01-12 | End: 2023-07-10

## 2023-01-12 RX ORDER — TRAZODONE HYDROCHLORIDE 50 MG/1
75 TABLET, FILM COATED ORAL AT BEDTIME
Qty: 135 TABLET | Refills: 3 | Status: SHIPPED | OUTPATIENT
Start: 2023-01-12 | End: 2024-01-08

## 2023-01-12 RX ORDER — CELECOXIB 100 MG/1
100 CAPSULE ORAL 2 TIMES DAILY
COMMUNITY
Start: 2022-12-19 | End: 2023-01-12

## 2023-01-12 ASSESSMENT — ANXIETY QUESTIONNAIRES
GAD7 TOTAL SCORE: 1
GAD7 TOTAL SCORE: 1
2. NOT BEING ABLE TO STOP OR CONTROL WORRYING: NOT AT ALL
3. WORRYING TOO MUCH ABOUT DIFFERENT THINGS: SEVERAL DAYS
GAD7 TOTAL SCORE: 1
5. BEING SO RESTLESS THAT IT IS HARD TO SIT STILL: NOT AT ALL
7. FEELING AFRAID AS IF SOMETHING AWFUL MIGHT HAPPEN: NOT AT ALL
IF YOU CHECKED OFF ANY PROBLEMS ON THIS QUESTIONNAIRE, HOW DIFFICULT HAVE THESE PROBLEMS MADE IT FOR YOU TO DO YOUR WORK, TAKE CARE OF THINGS AT HOME, OR GET ALONG WITH OTHER PEOPLE: NOT DIFFICULT AT ALL
1. FEELING NERVOUS, ANXIOUS, OR ON EDGE: NOT AT ALL
7. FEELING AFRAID AS IF SOMETHING AWFUL MIGHT HAPPEN: NOT AT ALL
4. TROUBLE RELAXING: NOT AT ALL
8. IF YOU CHECKED OFF ANY PROBLEMS, HOW DIFFICULT HAVE THESE MADE IT FOR YOU TO DO YOUR WORK, TAKE CARE OF THINGS AT HOME, OR GET ALONG WITH OTHER PEOPLE?: NOT DIFFICULT AT ALL
6. BECOMING EASILY ANNOYED OR IRRITABLE: NOT AT ALL

## 2023-01-12 ASSESSMENT — ENCOUNTER SYMPTOMS
CARDIOVASCULAR NEGATIVE: 1
NEUROLOGICAL NEGATIVE: 1
PSYCHIATRIC NEGATIVE: 1
GASTROINTESTINAL NEGATIVE: 1
MUSCULOSKELETAL NEGATIVE: 1
EYES NEGATIVE: 1
ENDOCRINE NEGATIVE: 1
RESPIRATORY NEGATIVE: 1
CONSTITUTIONAL NEGATIVE: 1
HEMATOLOGIC/LYMPHATIC NEGATIVE: 1
ALLERGIC/IMMUNOLOGIC NEGATIVE: 1

## 2023-01-12 ASSESSMENT — PATIENT HEALTH QUESTIONNAIRE - PHQ9
10. IF YOU CHECKED OFF ANY PROBLEMS, HOW DIFFICULT HAVE THESE PROBLEMS MADE IT FOR YOU TO DO YOUR WORK, TAKE CARE OF THINGS AT HOME, OR GET ALONG WITH OTHER PEOPLE: NOT DIFFICULT AT ALL
SUM OF ALL RESPONSES TO PHQ QUESTIONS 1-9: 0
SUM OF ALL RESPONSES TO PHQ QUESTIONS 1-9: 0

## 2023-01-12 NOTE — LETTER
[facility] [place]  01/12/23  Patient: Yefri Vincent  YOB: 1975  Medical Record Number: [id number]                                                                                  Non-Opioid Controlled Substance Agreement  (         clonazePAM (KLONOPIN) 1 MG tablet)    This is an agreement between you and your provider regarding safe and appropriate use of controlled substances prescribed by your care team. Controlled substances are?medicines that can cause physical and mental dependence (abuse).     There are strict laws about having and using these medicines. We here at [facility] are  committed to working with you in your efforts to get better. To support you in this work, we'll help you schedule regular office appointments for medicine refills. If we must cancel or change your appointment for any reason, we'll make sure you have enough medicine to last until your next appointment.     As a Provider, I will:     Listen carefully to your concerns while treating you with respect.     Recommend a treatment plan that I believe is in your best interest and may involve therapies other than medicine.      Talk with you often about the possible benefits and the risk of harm of any medicine that we prescribe for you.    Assess the safety of this medicine and check how well it works.      Provide a plan on how to taper (discontinue or go off) using this medicine if the decision is made to stop its use.      ::  As a Patient, I understand controlled substances:       Are prescribed by my care provider to help me function or work and manage my condition(s).?    Are strong medicines and can cause serious side effects.       Need to be taken exactly as prescribed.?Combining controlled substances with certain medicines or chemicals (such as illegal drugs, alcohol, sedatives, sleeping pills, and benzodiazepines) can be dangerous or even fatal.? If I stop taking my medicines suddenly, I  may have severe withdrawal symptoms.     The risks, benefits, and side effects of these medicine(s) were explained to me. I agree that:    1. I will take part in other treatments as advised by my care team. This may be psychiatry or counseling, physical therapy, behavioral therapy, group treatment or a referral to specialist.    2. I will keep all my appointments and understand this is part of the monitoring of controlled substances.?My care team may require an office visit for EVERY controlled substance refill. If I miss appointments or don t follow instructions, my care team may stop my medicine    3. I will take my medicines as prescribed. I will not change the dose or schedule unless my care team tells me to. There will be no refills if I run out early.      4. I may be asked to come to the clinic and complete a urine drug test or complete a pill count. If I don t give a urine sample or participate in a pill count, the care team may stop my medicine.    5. I will only receive controlled substance prescriptions from this clinic. If I am treated by another provider, I will tell them that I am taking controlled substances and that I have a treatment agreement with this provider. I will inform my [facility] care team within one business day if I am given a prescription for any controlled substance by another healthcare provider. My [facility] care team can contact other providers and pharmacists about my use of any medicines.    6. It is up to me to make sure that I don't run out of my medicines on weekends or holidays.?If my care team is willing to refill my prescription without a visit, I must request refills only during office hours. Refills may take up to 3 business days to process. I will use one pharmacy to fill all my controlled substance prescriptions. I will notify the clinic about any changes to my insurance or medicine availability.    7. I am responsible for my prescriptions. If the  medicine/prescription is lost, stolen or destroyed, it will not be replaced.?I also agree not to share controlled substance medicines with anyone.     8. I am aware I should not use any illegal or recreational drugs. I agree not to drink alcohol unless my care team says I can.     9. If I enroll in the [place] Medical Cannabis program, I will tell my care team before my next refill.    10. I will tell my care team right away if I become pregnant, have a new medical problem treated outside of my regular clinic, or have a change in my medicines.     11. I understand that this medicine can affect my thinking, judgment and reaction time.? Alcohol and drugs affect the brain and body, which can affect the safety of my driving. Being under the influence of alcohol or drugs can affect my decision-making, behaviors, personal safety and the safety of others. Driving while impaired (DWI) can occur if a person is driving, operating or in physical control of a car, motorcycle, boat, snowmobile, ATV, motorbike, off-road vehicle or any other motor vehicle ([place] Statute 169[place].20). I understand the risk if I choose to drive or operate any vehicle or machinery.    I understand that if I do not follow any of the conditions above, my prescriptions or treatment may be stopped or changed.   I agree that my provider, clinic care team and pharmacy may work with any city, state or federal law enforcement agency that investigates the misuse, sale or other diversion of my controlled medicine. I will allow my provider to discuss my care with, or share a copy of, this agreement with any other treating provider, pharmacy or emergency room where I receive care.     I have read this agreement and have asked questions about anything I did not understand.    ________________________________________________________  Patient Signature - Yefri Vincent     ___________________                   Date      ________________________________________________________  Provider Signature - Kerith W. Lijewski, MD       ___________________                   Date     ________________________________________________________  Witness Signature (required if provider not present while patient signing)          ___________________                   Date

## 2023-01-12 NOTE — PROGRESS NOTES
Assessment & Plan Anxiety is    Generalized anxiety disorder  Currently adequately controlled, no change in medications at this time.  We will venlafaxine for a year.  Discussed clonazepam.  Recommend using sparingly and reminded that refills  in 6 months.  Will need follow-up visit when refills .  PDMP is reviewed  - clonazePAM (KLONOPIN) 1 MG tablet; Take 1 tablet (1 mg) by mouth 2 times daily as needed for anxiety  - venlafaxine (EFFEXOR XR) 150 MG 24 hr capsule; Take 1 capsule (150 mg) by mouth daily  - venlafaxine (EFFEXOR XR) 75 MG 24 hr capsule; Take 1 capsule (75 mg) by mouth daily    Recurrent major depressive disorder, in remission (H)  PHQ-9 score is 0 and patient has no symptoms of depression, currently in remission, continue to monitor PHQ-9 annually    Encounter for long-term (current) use of medications  Patient on long-term benzodiazepines, has been effective without any concerns, recommend minimizing use to improve effectiveness, controlled substance agreement updated     Screen for colon cancer  discussed options for colon cancer screening and patient will proceed with colonoscopy    - Colonoscopy Screening  Referral; Future    Primary insomnia  Effective at current dose refilled for a year, no side effects noted  - traZODone (DESYREL) 50 MG tablet; Take 1.5 tablets (75 mg) by mouth At Bedtime    Chronic low back pain, unspecified back pain laterality, unspecified whether sciatica present  Patient has completed physical therapy and injections without much benefit but saw significant improvement in symptoms with starting Celebrex.  We will check a basic metabolic panel as its been 10 years since the last time we have a creatinine on file.  If normal as expected okay to continue long-term and we will monitor kidney function from time to time.  - celecoxib (CELEBREX) 100 MG capsule; Take 1 capsule (100 mg) by mouth 2 times daily  - Basic metabolic panel; Future  - Basic  "metabolic panel             BMI:   Estimated body mass index is 30.98 kg/m  as calculated from the following:    Height as of 7/2/22: 1.854 m (6' 1\").    Weight as of this encounter: 106.5 kg (234 lb 12.8 oz).           No follow-ups on file.    Kaylah Ruiz MD  [facility]    Hiral Asif is a 47 year old accompanied by his self, presenting for the following health issues:  Recheck Medication (Patient states no questions/concerns regarding medications, needing refills.)      History of Present Illness       Mental Health Follow-up:  Patient presents to follow-up on Anxiety.    Patient's anxiety since last visit has been:  Good  The patient is not having other symptoms associated with anxiety.  Any significant life events: No  Patient is not feeling anxious or having panic attacks.  Patient has no concerns about alcohol or drug use.    He eats 0-1 servings of fruits and vegetables daily.He consumes 0 sweetened beverage(s) daily.He exercises with enough effort to increase his heart rate 9 or less minutes per day.  He exercises with enough effort to increase his heart rate 3 or less days per week.   He is taking medications regularly.    Today's PHQ-9         PHQ-9 Total Score: 0    PHQ-9 Q9 Thoughts of better off dead/self-harm past 2 weeks :   Not at all    How difficult have these problems made it for you to do your work, take care of things at home, or get along with other people: Not difficult at all  Today's DARÍO-7 Score: 1       Medication Followup     Taking Medication as prescribed: yes    Side Effects:  None    Medication Helping Symptoms:  Yes    Since last time we met patient has had significant issues with low back pain.  He went through physical therapy and injections without significant improvement and then was started on Celebrex which resulted in symptoms becoming manageable.  He is not any known side effects.  Would like to continue on this.    Also discussed " colon cancer screening and patient will proceed with colonoscopy after discussion.    Anxiety and depression are under excellent control.  Uses clonazepam as needed.  Discussed refills and controlled substance agreement is updated.        Review of Systems   Constitutional: Negative.    HENT: Negative.    Eyes: Negative.    Respiratory: Negative.    Cardiovascular: Negative.    Gastrointestinal: Negative.    Endocrine: Negative.    Genitourinary: Negative.    Musculoskeletal: Negative.    Skin: Negative.    Allergic/Immunologic: Negative.    Neurological: Negative.    Hematological: Negative.    Psychiatric/Behavioral: Negative.             Objective    /68   Pulse 78   Resp 14   Wt 106.5 kg (234 lb 12.8 oz)   SpO2 98%   BMI 30.98 kg/m    Body mass index is 30.98 kg/m .  Physical Exam   GENERAL: healthy, alert and no distress  EYES: Eyes grossly normal to inspection, PERRL and conjunctivae and sclerae normal  HENT: normal cephalic/atraumatic and ear canals and TM's normal  NECK: no adenopathy, no asymmetry, masses, or scars and thyroid normal to palpation  RESP: lungs clear to auscultation - no rales, rhonchi or wheezes  CV: regular rate and rhythm, normal S1 S2, no S3 or S4, no murmur, click or rub, no peripheral edema and peripheral pulses strong

## 2023-03-23 ENCOUNTER — TRANSFERRED RECORDS (OUTPATIENT)
Dept: HEALTH INFORMATION MANAGEMENT | Facility: CLINIC | Age: 48
End: 2023-03-23
Payer: COMMERCIAL

## 2023-06-04 ENCOUNTER — HEALTH MAINTENANCE LETTER (OUTPATIENT)
Age: 48
End: 2023-06-04

## 2023-07-09 DIAGNOSIS — M54.50 CHRONIC LOW BACK PAIN, UNSPECIFIED BACK PAIN LATERALITY, UNSPECIFIED WHETHER SCIATICA PRESENT: ICD-10-CM

## 2023-07-09 DIAGNOSIS — F41.1 GENERALIZED ANXIETY DISORDER: ICD-10-CM

## 2023-07-09 DIAGNOSIS — G89.29 CHRONIC LOW BACK PAIN, UNSPECIFIED BACK PAIN LATERALITY, UNSPECIFIED WHETHER SCIATICA PRESENT: ICD-10-CM

## 2023-07-09 NOTE — TELEPHONE ENCOUNTER
"Routing refill request to provider for review/approval because:  Drug not on the [facility] refill protocol     Last Written Prescription Date:  1/12/23  Last Fill Quantity: 60,  # refills: 5   Last office visit provider:  1/12/23    Requested Prescriptions   Pending Prescriptions Disp Refills     clonazePAM (KLONOPIN) 1 MG tablet [Pharmacy Med Name: clonazepam 1 mg tablet] 60 tablet 5     Sig: Take 1 tablet (1 mg) by mouth 2 times daily as needed for anxiety       There is no refill protocol information for this order     Last Written Prescription Date:  1/12/23  Last Fill Quantity: 90,  # refills: 1   Last office visit provider:  1/12/23       venlafaxine (EFFEXOR XR) 75 MG 24 hr capsule [Pharmacy Med Name: venlafaxine ER 75 mg capsule,extended release 24 hr] 90 capsule 3     Sig: Take 1 capsule (75 mg) by mouth daily       Serotonin-Norepinephrine Reuptake Inhibitors  Passed - 7/9/2023  8:01 AM        Passed - Blood pressure under 140/90 in past 12 months     BP Readings from Last 3 Encounters:   01/12/23 114/68   03/30/22 111/76   07/05/21 104/60                 Passed - Recent (12 mo) or future (30 days) visit within the authorizing provider's specialty     Patient has had an office visit with the authorizing provider or a provider within the authorizing providers department within the previous 12 mos or has a future within next 30 days. See \"Patient Info\" tab in inbasket, or \"Choose Columns\" in Meds & Orders section of the refill encounter.              Passed - Medication is active on med list        Passed - Patient is age 18 or older        Passed - Normal serum creatinine on file in past 12 months     Recent Labs   Lab Test 01/12/23  0837   CR 1.11       Ok to refill medication if creatinine is low        Routing refill request to provider for review/approval because:  Labs not current:  AST, ALT, CBC    Last Written Prescription Date:  1/12/23  Last Fill Quantity: 90,  # refills: 3   Last office visit provider:  " "1/12/23       celecoxib (CELEBREX) 100 MG capsule [Pharmacy Med Name: celecoxib 100 mg capsule] 90 capsule 3     Sig: Take 1 capsule (100 mg) by mouth 2 times daily       NSAID Medications Failed - 7/9/2023  8:01 AM        Failed - Normal ALT on file in past 12 months     No lab results found.          Failed - Normal AST on file in past 12 months     No lab results found.          Failed - Normal CBC on file in past 12 months     Recent Labs   Lab Test 07/05/21  0846   WBC 6.1   RBC 5.58   HGB 16.3   HCT 50.1*                    Passed - Blood pressure under 140/90 in past 12 months     BP Readings from Last 3 Encounters:   01/12/23 114/68   03/30/22 111/76   07/05/21 104/60                 Passed - Recent (12 mo) or future (30 days) visit within the authorizing provider's specialty     Patient has had an office visit with the authorizing provider or a provider within the authorizing providers department within the previous 12 mos or has a future within next 30 days. See \"Patient Info\" tab in inbasket, or \"Choose Columns\" in Meds & Orders section of the refill encounter.              Passed - Patient is age 6-64 years        Passed - Medication is active on med list        Passed - Normal serum creatinine on file in past 12 months     Recent Labs   Lab Test 01/12/23  0837   CR 1.11       Ok to refill medication if creatinine is low               Amanda Roberson, RN 07/09/23 2:41 PM  "

## 2023-07-10 RX ORDER — CELECOXIB 100 MG/1
100 CAPSULE ORAL 2 TIMES DAILY
Qty: 90 CAPSULE | Refills: 0 | Status: SHIPPED | OUTPATIENT
Start: 2023-07-10 | End: 2023-08-07

## 2023-07-10 RX ORDER — CLONAZEPAM 1 MG/1
1 TABLET ORAL 2 TIMES DAILY PRN
Qty: 60 TABLET | Refills: 0 | Status: SHIPPED | OUTPATIENT
Start: 2023-07-10 | End: 2023-08-07

## 2023-07-10 RX ORDER — VENLAFAXINE HYDROCHLORIDE 75 MG/1
75 CAPSULE, EXTENDED RELEASE ORAL DAILY
Qty: 90 CAPSULE | Refills: 0 | Status: SHIPPED | OUTPATIENT
Start: 2023-07-10 | End: 2023-08-07

## 2023-08-04 DIAGNOSIS — M54.50 CHRONIC LOW BACK PAIN, UNSPECIFIED BACK PAIN LATERALITY, UNSPECIFIED WHETHER SCIATICA PRESENT: ICD-10-CM

## 2023-08-04 DIAGNOSIS — G89.29 CHRONIC LOW BACK PAIN, UNSPECIFIED BACK PAIN LATERALITY, UNSPECIFIED WHETHER SCIATICA PRESENT: ICD-10-CM

## 2023-08-04 NOTE — TELEPHONE ENCOUNTER
Sounds like patient needs an appointment to refill the Celebrex as its been 6 months since she has seen him.  No further refills at this time.

## 2023-08-04 NOTE — TELEPHONE ENCOUNTER
"Routing refill request to provider for review/approval because:  Labs not current:  Multiple  Early refill request    Last Written Prescription Date:  7/10/23  Last Fill Quantity: 90,  # refills: 0   Last office visit provider:   1/12/23 with darian    Requested Prescriptions   Pending Prescriptions Disp Refills    celecoxib (CELEBREX) 100 MG capsule [Pharmacy Med Name: celecoxib 100 mg capsule] 90 capsule 2     Sig: Take 1 capsule (100 mg) by mouth 2 times daily       NSAID Medications Failed - 8/4/2023 11:45 AM        Failed - Normal ALT on file in past 12 months     No lab results found.          Failed - Normal AST on file in past 12 months     No lab results found.          Failed - Normal CBC on file in past 12 months     Recent Labs   Lab Test 07/05/21  0846   WBC 6.1   RBC 5.58   HGB 16.3   HCT 50.1*                    Passed - Blood pressure under 140/90 in past 12 months     BP Readings from Last 3 Encounters:   01/12/23 114/68   03/30/22 111/76   07/05/21 104/60                 Passed - Recent (12 mo) or future (30 days) visit within the authorizing provider's specialty     Patient has had an office visit with the authorizing provider or a provider within the authorizing providers department within the previous 12 mos or has a future within next 30 days. See \"Patient Info\" tab in inbasket, or \"Choose Columns\" in Meds & Orders section of the refill encounter.              Passed - Patient is age 6-64 years        Passed - Medication is active on med list        Passed - Normal serum creatinine on file in past 12 months     Recent Labs   Lab Test 01/12/23  0837   CR 1.11       Ok to refill medication if creatinine is low               NIA CHERRY RN 08/04/23 11:45 AM  "

## 2023-08-05 DIAGNOSIS — F41.1 GENERALIZED ANXIETY DISORDER: ICD-10-CM

## 2023-08-06 RX ORDER — CLONAZEPAM 1 MG/1
1 TABLET ORAL 2 TIMES DAILY PRN
Qty: 60 TABLET | Refills: 0 | OUTPATIENT
Start: 2023-08-06

## 2023-08-07 ENCOUNTER — OFFICE VISIT (OUTPATIENT)
Dept: FAMILY MEDICINE | Facility: CLINIC | Age: 48
End: 2023-08-07
Payer: COMMERCIAL

## 2023-08-07 ENCOUNTER — ANCILLARY PROCEDURE (OUTPATIENT)
Dept: GENERAL RADIOLOGY | Facility: CLINIC | Age: 48
End: 2023-08-07
Attending: FAMILY MEDICINE
Payer: COMMERCIAL

## 2023-08-07 VITALS
SYSTOLIC BLOOD PRESSURE: 124 MMHG | DIASTOLIC BLOOD PRESSURE: 78 MMHG | BODY MASS INDEX: 31.45 KG/M2 | WEIGHT: 238.4 LBS | RESPIRATION RATE: 14 BRPM | HEART RATE: 76 BPM | OXYGEN SATURATION: 98 %

## 2023-08-07 DIAGNOSIS — M54.50 CHRONIC LOW BACK PAIN, UNSPECIFIED BACK PAIN LATERALITY, UNSPECIFIED WHETHER SCIATICA PRESENT: ICD-10-CM

## 2023-08-07 DIAGNOSIS — G89.29 CHRONIC LOW BACK PAIN, UNSPECIFIED BACK PAIN LATERALITY, UNSPECIFIED WHETHER SCIATICA PRESENT: ICD-10-CM

## 2023-08-07 DIAGNOSIS — M79.642 PAIN OF LEFT HAND: Primary | ICD-10-CM

## 2023-08-07 DIAGNOSIS — F41.1 GENERALIZED ANXIETY DISORDER: ICD-10-CM

## 2023-08-07 PROCEDURE — 99214 OFFICE O/P EST MOD 30 MIN: CPT | Performed by: FAMILY MEDICINE

## 2023-08-07 PROCEDURE — 73130 X-RAY EXAM OF HAND: CPT | Mod: TC | Performed by: RADIOLOGY

## 2023-08-07 RX ORDER — VENLAFAXINE HYDROCHLORIDE 150 MG/1
150 CAPSULE, EXTENDED RELEASE ORAL DAILY
Qty: 90 CAPSULE | Refills: 3 | Status: SHIPPED | OUTPATIENT
Start: 2023-08-07 | End: 2024-01-08

## 2023-08-07 RX ORDER — CELECOXIB 100 MG/1
100 CAPSULE ORAL 2 TIMES DAILY
Qty: 180 CAPSULE | Refills: 3 | Status: SHIPPED | OUTPATIENT
Start: 2023-08-07 | End: 2024-01-08

## 2023-08-07 RX ORDER — CLONAZEPAM 1 MG/1
1 TABLET ORAL 2 TIMES DAILY PRN
Qty: 60 TABLET | Refills: 5 | Status: SHIPPED | OUTPATIENT
Start: 2023-08-07 | End: 2024-01-08

## 2023-08-07 RX ORDER — VENLAFAXINE HYDROCHLORIDE 75 MG/1
75 CAPSULE, EXTENDED RELEASE ORAL DAILY
Qty: 90 CAPSULE | Refills: 0 | Status: SHIPPED | OUTPATIENT
Start: 2023-08-07 | End: 2024-01-02

## 2023-08-07 ASSESSMENT — PATIENT HEALTH QUESTIONNAIRE - PHQ9
SUM OF ALL RESPONSES TO PHQ QUESTIONS 1-9: 1
SUM OF ALL RESPONSES TO PHQ QUESTIONS 1-9: 1
10. IF YOU CHECKED OFF ANY PROBLEMS, HOW DIFFICULT HAVE THESE PROBLEMS MADE IT FOR YOU TO DO YOUR WORK, TAKE CARE OF THINGS AT HOME, OR GET ALONG WITH OTHER PEOPLE: NOT DIFFICULT AT ALL

## 2023-08-07 NOTE — TELEPHONE ENCOUNTER
Patient is scheduled with PCP today 08/07, patient currently arrived, patient made aware at appointment.

## 2023-08-07 NOTE — TELEPHONE ENCOUNTER
Please call patient. Needs to schedule 6 month follow up. Once scheduled I'm happy to send in prescriptions to cover until he can be seen.

## 2023-08-07 NOTE — PROGRESS NOTES
"  Assessment & Plan     Pain of left hand  Patient with pain in left wrist with radicular numbness to middle finger, possibly related to inflammation.  Recommend immobilization in splint for a couple of weeks to see if that settles it down.  Consider referral to orthopedics if not getting better.  - XR Hand Left G/E 3 Views    Generalized anxiety disorder  Overall stable on current regimen, no side effects of treatment noted, continue current medication.  Follow-up in 6 months.  - venlafaxine (EFFEXOR XR) 75 MG 24 hr capsule; Take 1 capsule (75 mg) by mouth daily  - clonazePAM (KLONOPIN) 1 MG tablet; Take 1 tablet (1 mg) by mouth 2 times daily as needed for anxiety  - venlafaxine (EFFEXOR XR) 150 MG 24 hr capsule; Take 1 capsule (150 mg) by mouth daily    Chronic low back pain, unspecified back pain laterality, unspecified whether sciatica present  Significantly improved on Celebrex, refilled for a year  - celecoxib (CELEBREX) 100 MG capsule; Take 1 capsule (100 mg) by mouth 2 times daily                     Kaylah Ruiz MD  [facility]    Hiral Asif is a 47 year old, presenting for the following health issues:  Recheck Medication (Patient states no concerns just needing refills.) and Hand Pain (Left Hand - Pinky Finger, Numbness in Middle Finger (middle joint), rubbing top of hand two fingers get \"tingly\"./NKI OR SURGERY )        8/7/2023     3:29 PM   Additional Questions   Roomed by Gladis LANGLEY CMA   Accompanied by None       Hand Pain    History of Present Illness       Reason for visit:  Left Hand Pain And Numbness  Symptom onset:  More than a month  Symptoms include:  Pain and numbness  Symptom intensity:  Moderate  Symptom progression:  Staying the same  Had these symptoms before:  No  What makes it worse:  No  What makes it better:  No    He eats 0-1 servings of fruits and vegetables daily.He consumes 0 sweetened beverage(s) daily.He exercises with enough effort to " increase his heart rate 9 or less minutes per day.  He exercises with enough effort to increase his heart rate 3 or less days per week.   He is taking medications regularly.     Patient's primary concern today is left hand pain.  He is tender at the base of his fourth and fifth metacarpals without any known injury.  He also notices numbness at the PCP joint of the middle finger.  There is no deformity noted.  Has been using ice.  Celebrex does not seem to make a difference although it has been significantly helpful for his back pain    Also discussed anxiety.  Current dosing of venlafaxine along with clonazepam has been helpful.  He has significant symptoms if he tries to go without his clonazepam.  Does use it on an as-needed basis but filled 60 tablets every month.  PDMP is reviewed.  No side effects noted.            Review of Systems         Objective    /78   Pulse 76   Resp 14   Wt 108.1 kg (238 lb 6.4 oz)   SpO2 98%   BMI 31.45 kg/m    Body mass index is 31.45 kg/m .  Physical Exam   GENERAL: healthy, alert and no distress  MS: Left hand without deformity, tender at the base of the fifth metatarsal with deep palpation, normal range of motion, no swelling  PSYCH: mentation appears normal, affect normal/bright    Xray - Reviewed and interpreted by me.  Hand x-ray of left hand is normal by my read.

## 2023-08-11 RX ORDER — CELECOXIB 100 MG/1
100 CAPSULE ORAL 2 TIMES DAILY
Qty: 90 CAPSULE | Refills: 2 | OUTPATIENT
Start: 2023-08-11

## 2023-08-11 NOTE — TELEPHONE ENCOUNTER
Patient was seen, and medication was sent.     Routed encounter to nurse refill pool to DENY medication refill request as clinical staff is unable to do so then please close encounter.

## 2023-09-01 DIAGNOSIS — M54.50 CHRONIC LOW BACK PAIN, UNSPECIFIED BACK PAIN LATERALITY, UNSPECIFIED WHETHER SCIATICA PRESENT: ICD-10-CM

## 2023-09-01 DIAGNOSIS — G89.29 CHRONIC LOW BACK PAIN, UNSPECIFIED BACK PAIN LATERALITY, UNSPECIFIED WHETHER SCIATICA PRESENT: ICD-10-CM

## 2023-09-01 RX ORDER — CELECOXIB 100 MG/1
100 CAPSULE ORAL 2 TIMES DAILY
Qty: 90 CAPSULE | Refills: 0 | OUTPATIENT
Start: 2023-09-01

## 2023-09-02 NOTE — TELEPHONE ENCOUNTER
"Refused because:  Should have refills on file    Last Written Prescription Date:  8/7/23  Last Fill Quantity: 180,  # refills: 3   Last office visit provider:   8/7/23 with darian    Requested Prescriptions   Pending Prescriptions Disp Refills    celecoxib (CELEBREX) 100 MG capsule [Pharmacy Med Name: celecoxib 100 mg capsule] 90 capsule 0     Sig: Take 1 capsule (100 mg) by mouth 2 times daily       NSAID Medications Failed - 9/1/2023  7:13 PM        Failed - Normal ALT on file in past 12 months     No lab results found.          Failed - Normal AST on file in past 12 months     No lab results found.          Failed - Normal CBC on file in past 12 months     Recent Labs   Lab Test 07/05/21  0846   WBC 6.1   RBC 5.58   HGB 16.3   HCT 50.1*                    Passed - Blood pressure under 140/90 in past 12 months     BP Readings from Last 3 Encounters:   08/07/23 124/78   01/12/23 114/68   03/30/22 111/76                 Passed - Recent (12 mo) or future (30 days) visit within the authorizing provider's specialty     Patient has had an office visit with the authorizing provider or a provider within the authorizing providers department within the previous 12 mos or has a future within next 30 days. See \"Patient Info\" tab in inbasket, or \"Choose Columns\" in Meds & Orders section of the refill encounter.              Passed - Patient is age 6-64 years        Passed - Medication is active on med list        Passed - Normal serum creatinine on file in past 12 months     Recent Labs   Lab Test 01/12/23  0837   CR 1.11       Ok to refill medication if creatinine is low               NIA CHERRY RN 09/01/23 7:13 PM  " Quality 131: Pain Assessment And Follow-Up: Pain assessment using a standardized tool is documented as negative, no follow-up plan required Quality 110: Preventive Care And Screening: Influenza Immunization: Influenza Immunization previously received during influenza season Quality 431: Preventive Care And Screening: Unhealthy Alcohol Use - Screening: Patient screened for unhealthy alcohol use using a single question and scores less than 2 times per year Quality 130: Documentation Of Current Medications In The Medical Record: Current Medications Documented Quality 226: Preventive Care And Screening: Tobacco Use: Screening And Cessation Intervention: Patient screened for tobacco use and is an ex/non-smoker Detail Level: Detailed

## 2023-12-30 DIAGNOSIS — F41.1 GENERALIZED ANXIETY DISORDER: ICD-10-CM

## 2024-01-01 NOTE — NURSING NOTE
CAGE Assessment Entered On:  1/15/2020 3:43 PM CST    Performed On:  1/15/2020 3:43 PM CST by Fadia Ray CMA               Assessment   Have you ever felt you should cut down on your drinking :   No   Have people annoyed you by criticizing your drinking :   No   Have you ever felt bad or guilty about your drinking :   No   Have you ever taken a drink first thing in the morning to steady your nerves or get rid of a hangover (Eye-opener) :   No   CAGE Score :   0    Fadia Ray CMA - 1/15/2020 3:43 PM CST   I REVIEWED CHART. PATRICK WAS LAST SEEN ON 2024 BY DR. BENOIT, WHO DOCUMENTED THAT PATRICK WAS DOING WELL ON THE PEPCID . SHE DOCUMENTED SHE WILL CONTINUE DOSAGE OF 0.6MG/KG 2 TIMES A DAY.= 0.4 ML 2 X DAY .  .  SHE SENT IN 1 30 DAY SCRIPT WITH 2 REFILLS PATRICK HAS A FOLLOW UP APPT SCHEDULED ON 2024 WITH DR. LIMA. . I CALLED AND SPOKE WITH FATHER. FATHER STATED MOM AND DAD NOTICED HER SLEEP HABITS AT NIGHT HAVE CHANGED? DUE TO REFLUX. THEY INTRODUCED CEREAL RECENTLY AT DINNER TIME. . SHE WAS SLEEPING THROUGHOUT NIGHT. NOW WAKING UP A COUPLE TIMES DURING THE NIGHT. DENIES ANY OTHER SYMPTOMS THAT WOULD CAUSE HER TO WAKE UP SUCH AS A FEVER, STUFFY RUNNY NOSE OR COUGH. DAD WONDERING IF CAN INCREASE PEPCID TO SEE IF THIS HELPS.  I INFORMED FATHER I WILL DISCUSS WITH DR. PATEL AND CALL HIM BACK.

## 2024-01-02 RX ORDER — VENLAFAXINE HYDROCHLORIDE 75 MG/1
75 CAPSULE, EXTENDED RELEASE ORAL DAILY
Qty: 90 CAPSULE | Refills: 0 | Status: SHIPPED | OUTPATIENT
Start: 2024-01-02 | End: 2024-01-08

## 2024-01-08 ENCOUNTER — OFFICE VISIT (OUTPATIENT)
Dept: FAMILY MEDICINE | Facility: CLINIC | Age: 49
End: 2024-01-08
Payer: COMMERCIAL

## 2024-01-08 VITALS
DIASTOLIC BLOOD PRESSURE: 76 MMHG | BODY MASS INDEX: 32.05 KG/M2 | RESPIRATION RATE: 14 BRPM | SYSTOLIC BLOOD PRESSURE: 122 MMHG | HEART RATE: 85 BPM | OXYGEN SATURATION: 99 % | TEMPERATURE: 97.3 F | HEIGHT: 73 IN | WEIGHT: 241.8 LBS

## 2024-01-08 DIAGNOSIS — F51.01 PRIMARY INSOMNIA: ICD-10-CM

## 2024-01-08 DIAGNOSIS — Z00.00 ROUTINE GENERAL MEDICAL EXAMINATION AT A HEALTH CARE FACILITY: Primary | ICD-10-CM

## 2024-01-08 DIAGNOSIS — F33.42 RECURRENT MAJOR DEPRESSIVE DISORDER, IN FULL REMISSION (H): ICD-10-CM

## 2024-01-08 DIAGNOSIS — F41.1 GENERALIZED ANXIETY DISORDER: ICD-10-CM

## 2024-01-08 PROCEDURE — 99396 PREV VISIT EST AGE 40-64: CPT | Performed by: FAMILY MEDICINE

## 2024-01-08 PROCEDURE — 99214 OFFICE O/P EST MOD 30 MIN: CPT | Mod: 25 | Performed by: FAMILY MEDICINE

## 2024-01-08 RX ORDER — CLONAZEPAM 1 MG/1
1 TABLET ORAL 2 TIMES DAILY PRN
Qty: 60 TABLET | Refills: 5 | Status: SHIPPED | OUTPATIENT
Start: 2024-01-08 | End: 2024-07-08

## 2024-01-08 RX ORDER — VENLAFAXINE HYDROCHLORIDE 75 MG/1
75 CAPSULE, EXTENDED RELEASE ORAL DAILY
Qty: 90 CAPSULE | Refills: 3 | Status: SHIPPED | OUTPATIENT
Start: 2024-01-08

## 2024-01-08 RX ORDER — VENLAFAXINE HYDROCHLORIDE 150 MG/1
150 CAPSULE, EXTENDED RELEASE ORAL DAILY
Qty: 90 CAPSULE | Refills: 3 | Status: SHIPPED | OUTPATIENT
Start: 2024-01-08

## 2024-01-08 RX ORDER — TRAZODONE HYDROCHLORIDE 50 MG/1
75 TABLET, FILM COATED ORAL AT BEDTIME
Qty: 135 TABLET | Refills: 3 | Status: SHIPPED | OUTPATIENT
Start: 2024-01-08

## 2024-01-08 ASSESSMENT — ENCOUNTER SYMPTOMS
EYE PAIN: 0
PARESTHESIAS: 0
ABDOMINAL PAIN: 0
ARTHRALGIAS: 0
FEVER: 0
NAUSEA: 0
SORE THROAT: 0
JOINT SWELLING: 0
FREQUENCY: 0
COUGH: 0
HEMATOCHEZIA: 0
HEARTBURN: 0
PALPITATIONS: 0
DIARRHEA: 0
DIZZINESS: 0
HEADACHES: 0
DYSURIA: 0
MYALGIAS: 0
SHORTNESS OF BREATH: 0
CONSTIPATION: 0
HEMATURIA: 0
NERVOUS/ANXIOUS: 0
WEAKNESS: 0
CHILLS: 0

## 2024-01-08 ASSESSMENT — PATIENT HEALTH QUESTIONNAIRE - PHQ9: SUM OF ALL RESPONSES TO PHQ QUESTIONS 1-9: 1

## 2024-01-08 NOTE — PROGRESS NOTES
SUBJECTIVE:   Yefri is a 48 year old, presenting for the following:  Physical (Patient states no concerns/questions, patient is fasting for lab work. )        2024     9:30 AM   Additional Questions   Roomed by Gladis LANGLEY CMA   Accompanied by None       Healthy Habits:     Getting at least 3 servings of Calcium per day:  Yes    Bi-annual eye exam:  Yes    Dental care twice a year:  Yes    Sleep apnea or symptoms of sleep apnea:  Daytime drowsiness    Diet:  Regular (no restrictions)    Frequency of exercise:  None    Duration of exercise:  N/A    Taking medications regularly:  Yes    Barriers to taking medications:  None    Medication side effects:  None    Additional concerns today:  No    Reviewed anxiety which is under excellent control.  No symptoms of depression.  PHQ-9 reviewed.  Is still taking clonazepam as prescribed.  Not noticing any side effects from medication.  Sleep has been good on trazodone.    Have you ever done Advance Care Planning? (For example, a Health Directive, POLST, or a discussion with a medical provider or your loved ones about your wishes): No, advance care planning information given to patient to review.  Patient declined advance care planning discussion at this time.    Social History     Tobacco Use    Smoking status: Former     Types: Cigarettes     Quit date: 2018     Years since quittin.0     Passive exposure: Past    Smokeless tobacco: Never   Substance Use Topics    Alcohol use: Not on file     Comment: occasionally             2024     9:21 AM   Alcohol Use   Prescreen: >3 drinks/day or >7 drinks/week? No       Last PSA:   PSA   Date Value Ref Range Status   2013 0.67 0 - 4 ug/L Final       Reviewed orders with patient. Reviewed health maintenance and updated orders accordingly - Yes      Reviewed and updated as needed this visit by clinical staff   Tobacco  Allergies  Meds  Problems  Med Hx  Surg Hx  Fam Hx          Reviewed and updated as needed this  "visit by Provider   Tobacco  Allergies  Meds  Problems  Med Hx  Surg Hx  Fam Hx             Review of Systems   Constitutional:  Negative for chills and fever.   HENT:  Negative for congestion, ear pain, hearing loss and sore throat.    Eyes:  Negative for pain and visual disturbance.   Respiratory:  Negative for cough and shortness of breath.    Cardiovascular:  Negative for chest pain, palpitations and peripheral edema.   Gastrointestinal:  Negative for abdominal pain, constipation, diarrhea, heartburn, hematochezia and nausea.   Genitourinary:  Negative for dysuria, frequency, genital sores, hematuria, impotence, penile discharge and urgency.   Musculoskeletal:  Negative for arthralgias, joint swelling and myalgias.   Skin:  Negative for rash.   Neurological:  Negative for dizziness, weakness, headaches and paresthesias.   Psychiatric/Behavioral:  Negative for mood changes. The patient is not nervous/anxious.          OBJECTIVE:   /76   Pulse 85   Temp 97.3  F (36.3  C)   Resp 14   Ht 1.854 m (6' 1\")   Wt 109.7 kg (241 lb 12.8 oz)   SpO2 99%   BMI 31.90 kg/m      Physical Exam  GENERAL: healthy, alert and no distress  EYES: Eyes grossly normal to inspection, PERRL and conjunctivae and sclerae normal  HENT: ear canals and TM's normal, nose and mouth without ulcers or lesions  NECK: no adenopathy, no asymmetry, masses, or scars and thyroid normal to palpation  RESP: lungs clear to auscultation - no rales, rhonchi or wheezes  CV: regular rate and rhythm, normal S1 S2, no S3 or S4, no murmur, click or rub, no peripheral edema and peripheral pulses strong  ABDOMEN: soft, nontender, no hepatosplenomegaly, no masses and bowel sounds normal  MS: no gross musculoskeletal defects noted, no edema  SKIN: no suspicious lesions or rashes  NEURO: Normal strength and tone, mentation intact and speech normal  PSYCH: mentation appears normal, affect normal/bright        ASSESSMENT/PLAN:   Routine general " medical examination at a health care facility  Health maintenance updated, preventive services reviewed, vaccines discussed, questions are answered, patient encouraged to continue annual wellness visits to review preventive services.  Declines vaccines.    Generalized anxiety disorder  Well-controlled on current medication  - venlafaxine (EFFEXOR XR) 75 MG 24 hr capsule; Take 1 capsule (75 mg) by mouth daily  - clonazePAM (KLONOPIN) 1 MG tablet; Take 1 tablet (1 mg) by mouth 2 times daily as needed for anxiety  - venlafaxine (EFFEXOR XR) 150 MG 24 hr capsule; Take 1 capsule (150 mg) by mouth daily    Primary insomnia  Well-controlled on current medication  - traZODone (DESYREL) 50 MG tablet; Take 1.5 tablets (75 mg) by mouth at bedtime    Recurrent major depressive disorder, in full remission (H24)  In full remission, on venlafaxine as noted for anxiety.            COUNSELING:   Reviewed preventive health counseling, as reflected in patient instructions        He reports that he quit smoking about 6 years ago. His smoking use included cigarettes. He has been exposed to tobacco smoke. He has never used smokeless tobacco.            Kaylah Ruiz MD  [facility]

## 2024-06-10 DIAGNOSIS — G89.29 CHRONIC LOW BACK PAIN, UNSPECIFIED BACK PAIN LATERALITY, UNSPECIFIED WHETHER SCIATICA PRESENT: ICD-10-CM

## 2024-06-10 DIAGNOSIS — M54.50 CHRONIC LOW BACK PAIN, UNSPECIFIED BACK PAIN LATERALITY, UNSPECIFIED WHETHER SCIATICA PRESENT: ICD-10-CM

## 2024-06-12 RX ORDER — CELECOXIB 100 MG/1
100 CAPSULE ORAL 2 TIMES DAILY
Qty: 180 CAPSULE | Refills: 2 | Status: SHIPPED | OUTPATIENT
Start: 2024-06-12

## 2024-07-08 DIAGNOSIS — F41.1 GENERALIZED ANXIETY DISORDER: ICD-10-CM

## 2024-07-08 RX ORDER — CLONAZEPAM 1 MG/1
1 TABLET ORAL 2 TIMES DAILY PRN
Qty: 60 TABLET | Refills: 5 | Status: SHIPPED | OUTPATIENT
Start: 2024-07-08

## 2024-10-18 ENCOUNTER — OFFICE VISIT (OUTPATIENT)
Dept: SLEEP MEDICINE | Facility: CLINIC | Age: 49
End: 2024-10-18
Payer: COMMERCIAL

## 2024-10-18 VITALS
WEIGHT: 239 LBS | OXYGEN SATURATION: 97 % | HEART RATE: 79 BPM | HEIGHT: 73 IN | DIASTOLIC BLOOD PRESSURE: 83 MMHG | BODY MASS INDEX: 31.68 KG/M2 | SYSTOLIC BLOOD PRESSURE: 122 MMHG

## 2024-10-18 DIAGNOSIS — G47.33 OBSTRUCTIVE SLEEP APNEA: Primary | ICD-10-CM

## 2024-10-18 DIAGNOSIS — G47.00 INSOMNIA, UNSPECIFIED TYPE: ICD-10-CM

## 2024-10-18 PROCEDURE — 99203 OFFICE O/P NEW LOW 30 MIN: CPT | Performed by: STUDENT IN AN ORGANIZED HEALTH CARE EDUCATION/TRAINING PROGRAM

## 2024-10-18 ASSESSMENT — SLEEP AND FATIGUE QUESTIONNAIRES
HOW LIKELY ARE YOU TO NOD OFF OR FALL ASLEEP WHILE LYING DOWN TO REST IN THE AFTERNOON WHEN CIRCUMSTANCES PERMIT: MODERATE CHANCE OF DOZING
HOW LIKELY ARE YOU TO NOD OFF OR FALL ASLEEP WHILE SITTING INACTIVE IN A PUBLIC PLACE: WOULD NEVER DOZE
HOW LIKELY ARE YOU TO NOD OFF OR FALL ASLEEP WHILE SITTING AND READING: SLIGHT CHANCE OF DOZING
HOW LIKELY ARE YOU TO NOD OFF OR FALL ASLEEP WHILE WATCHING TV: SLIGHT CHANCE OF DOZING
HOW LIKELY ARE YOU TO NOD OFF OR FALL ASLEEP WHEN YOU ARE A PASSENGER IN A CAR FOR AN HOUR WITHOUT A BREAK: SLIGHT CHANCE OF DOZING
HOW LIKELY ARE YOU TO NOD OFF OR FALL ASLEEP WHILE SITTING QUIETLY AFTER LUNCH WITHOUT ALCOHOL: SLIGHT CHANCE OF DOZING
HOW LIKELY ARE YOU TO NOD OFF OR FALL ASLEEP IN A CAR, WHILE STOPPED FOR A FEW MINUTES IN TRAFFIC: WOULD NEVER DOZE
HOW LIKELY ARE YOU TO NOD OFF OR FALL ASLEEP WHILE SITTING AND TALKING TO SOMEONE: WOULD NEVER DOZE

## 2024-10-18 NOTE — PATIENT INSTRUCTIONS
"MY INFORMATION ON SLEEP APNEA-  Yefri Vincent    DOCTOR : Cullen Patricia MD  SLEEP CENTER :      MY CONTACT NUMBER:    [facility]   [phone]  [facility]    Am I having a sleep study at a sleep center?  Make sure you have an appointment for the study before you leave!  [email or web address]    Am I having a home sleep study?  Watch the video for the device you are using:  /drop off device, Noxturnal T-3: [email or web address] [email or web address]/watch?v=yGGFBdELGhk  Disposable device sent out require phone/computer application, WatchPAT1: [email or web address]?[email or web address]_vbiwxE  The sleep lab will call you for this appointment   If you wish to reschedule the sleep study or contact the sleep lab scheduling call [phone]        Frequently asked questions:  1. What is Obstructive Sleep Apnea (JAYE)? JAYE is the most common type of sleep apnea. Apnea means, \"without breath.\"  Apnea is most often caused by narrowing or collapse of the upper airway as muscles relax during sleep.   Almost everyone has occasional apneas. Most people with sleep apnea have had brief interruptions at night frequently for many years.  The severity of sleep apnea is related to how frequent and severe the events are.   Apneas are any events where there is no breathing.  Hypopneas are any events where there is shallow breathing. Sleep studies will track and then add all of the apneas and hypopneas into a total and then divided this number by the total time asleep to obtain the apnea hypopnea index(AHI). 0-5 events/per hour = No Sleep Apnea; 5-15 events/per hour = Mild Sleep Apnea; 15-30 events/per hour = Moderate Sleep Apnea; Greater than 30 events/per hour = Severe Sleep Apnea.  Sleep apnea is typically worse during REM sleep due to complete muscle relaxation, including the muscles of the airway. Sleep apnea is also typically worse " during supine(sleeping on your back) sleep due to internal structures more easily falling on the top of the airway and external pressures more easily crushing the airway.  The respiratory disturbance index(RDI) includes apneas, hypopneas, and other respiratory events such as snoring that may disturb your sleep.  2. What are the consequences of JAYE? Symptoms include: feeling sleepy during the day, snoring loudly, gasping or stopping of breathing, trouble sleeping, and occasionally morning headaches or heartburn at night.  Sleepiness can be serious and even increase the risk of falling asleep while driving. Other health consequences may include development of high blood pressure and other cardiovascular disease in persons who are susceptible. Untreated JAYE  can contribute to heart disease, stroke and diabetes.   3. What are the treatment options? In most situations, sleep apnea is a lifelong disease that must be managed with daily therapy. Medications are not effective for sleep apnea and surgery is generally not considered until other therapies have been tried. Your treatment is your choice . Continuous Positive Airway (CPAP) works right away and is the therapy that is effective in nearly everyone. An oral device to hold your jaw forward is usually the next most reliable option. Other options include postioning devices (to keep you off your back), weight loss, and surgery including a tongue pacing device. There is more detail about some of these options below.    Important tips for using CPAP and similar devices   Know your equipment:  CPAP is continuous positive airway pressure that prevents obstructive sleep apnea by keeping the throat from collapsing while you are sleeping. In most cases, the device is  smart  and can slowly self-adjusts if your throat collapses and keeps a record every day of how well you are treated-this information is available to you and your care team.  BPAP is bilevel positive airway  pressure that keeps your throat open and also assists each breath with a pressure boost to maintain adequate breathing.  Special kinds of BPAP are used in patients who have inadequate breathing from lung or heart disease. In most cases, the device is  smart  and can slowly self-adjusts to assist breathing. Like CPAP, the device keeps a record of how well you are treated.  Your mask is your connection to the device. You get to choose what feels most comfortable and the staff will help to make sure if fits. Here: are some examples of the different masks that are available:       Key points to remember on your journey with sleep apnea:  Sleep study.  PAP devices often need to be adjusted during a sleep study to show that they are effective and adjusted right.  Good tips to remember: Try wearing just the mask during a quiet time during the day so your body adapts to wearing it. A humidifier is recommended for comfort in most cases to prevent drying of your nose and throat. Allergy medication from your provider may help you if you are having nasal congestion.  Getting settled-in. It takes more than one night for most of us to get used to wearing a mask. Try wearing just the mask during a quiet time during the day so your body adapts to wearing it. A humidifier is recommended for comfort in most cases. Our team will work with you carefully on the first day and will be in contact within 4 days and again at 2 and 4 weeks for advice and remote device adjustments. Your therapy is evaluated by the device each day.   Use it every night. The more you are able to sleep naturally for 7-8 hours, the more likely you will have good sleep and to prevent health risks or symptoms from sleep apnea. Even if you use it 4 hours it helps. Occasionally all of us are unable to use a medical therapy, in sleep apnea, it is not dangerous to miss one night.   Communicate. Call our skilled team on the number provided on the first day if your visit  for problems that make it difficult to wear the device. Over 2 out of 3 patients can learn to wear the device long-term with help from our team. Remember to call our team or your sleep providers if you are unable to wear the device as we may have other solutions for those who cannot adapt to mask CPAP therapy. It is recommended that you sleep your sleep provider within the first 3 months and yearly after that if you are not having problems.   Take care of your equipment. Make sure you clean your mask and tubing using directions every day and that your filter and mask are replaced as recommended or if they are not working.     BESIDES CPAP, WHAT OTHER THERAPIES ARE THERE?    Positioning Device  Positioning devices are generally used when sleep apnea is mild and only occurs on your back.This example shows a pillow that straps around the waist. It may be appropriate for those whose sleep study shows milder sleep apnea that occurs primarily when lying flat on one's back. Preliminary studies have shown benefit but effectiveness at home may need to be verified by a home sleep test. These devices are generally not covered by medical insurance.    Oral Appliance  What is oral appliance therapy?  An oral appliance device fits on your teeth at night like a retainer used after having braces. The device is made by a specialized dentist and requires several visits over 1-2 months before a manufactured device is made to fit your teeth and is adjusted to prevent your sleep apnea. Once an oral device is working properly, snoring should be improved. A home sleep test may be recommended at that time if to determine whether the sleep apnea is adequately treated.       Some things to remember:  -Oral devices are often, but not always, covered by your medical insurance. Be sure to check with your insurance provider.   -If you are referred for oral therapy, you will be given a list of specialized dentists to consider or you may choose to  visit the Web site of the American Academy of Dental Sleep Medicine  -Oral devices are less likely to work if you have severe sleep apnea or are extremely overweight.     More detailed information  An oral appliance is a small acrylic device that fits over the upper and lower teeth  (similar to a retainer or a mouth guard). This device slightly moves jaw forward, which moves the base of the tongue forward, opens the airway, improves breathing for effective treat snoring and obstructive sleep apnea in perhaps 7 out of 10 people .  The best working devices are custom-made by a dental device  after a mold is made of the teeth 1, 2, 3.  When is an oral appliance indicated?  Oral appliance therapy is recommended as a first-line treatment for patients with primary snoring, mild sleep apnea, and for patients with moderate sleep apnea who prefer appliance therapy to use of CPAP4, 5. Severity of sleep apnea is determined by sleep testing and is based on the number of respiratory events per hour of sleep.   How successful is oral appliance therapy?  The success rate of oral appliance therapy in patients with mild sleep apnea is 75-80% while in patients with moderate sleep apnea it is 50-70%. The chance of success in patients with severe sleep apnea is 40-50%. The research also shows that oral appliances have a beneficial effect on the cardiovascular health of JAYE patients at the same magnitude as CPAP therapy7.  Oral appliances should be a second-line treatment in cases of severe sleep apnea, but if not completely successful then a combination therapy utilizing CPAP plus oral appliance therapy may be effective. Oral appliances tend to be effective in a broad range of patients although studies show that the patients who have the highest success are females, younger patients, those with milder disease, and less severe obesity. 3, 6.   Finding a dentist that practices dental sleep medicine  Specific training is  available through the American Academy of Dental Sleep Medicine for dentists interested in working in the field of sleep. To find a dentist who is educated in the field of sleep and the use of oral appliances, near you, visit the Web site of the American Academy of Dental Sleep Medicine.    References  1. Eliud et al. Objectively measured vs self-reported compliance during oral appliance therapy for sleep-disordered breathing. Chest 2013; 144(5): 8643-9422.  2. Spencer, et al. Objective measurement of compliance during oral appliance therapy for sleep-disordered breathing. Thorax 2013; 68(1): 91-96.  3. Oskar et al. Mandibular advancement devices in 620 men and women with JAYE and snoring: tolerability and predictors of treatment success. Chest 2004; 125: 5395-8151.  4. Kyle et al. Oral appliances for snoring and JAYE: a review. Sleep 2006; 29: 244-262.  5. Yoni, et al. Oral appliance treatment for JAYE: an update. J Clin Sleep Med 2014; 10(2): 215-227.  6. Sussy et al. Predictors of OSAH treatment outcome. J Dent Res 2007; 86: 4048-7257.      Weight Loss:    Weight loss is a long-term strategy that may improve sleep apnea in some patients.    Weight management is a personal decision.  If you are interested in exploring weight loss strategies, the following discussion covers the impact on weight loss on sleep apnea and the approaches that may be successful.    Weight loss decreases severity of sleep apnea in most people with obesity. For those with mild obesity who have developed snoring with weight gain, even 15-30 pound weight loss can improve and occasionally eliminate sleep apnea.  Structured and life-long dietary and health habits are necessary to lose weight and keep healthier weight levels.     Though there may be significant health benefits from weight loss, long-term weight loss is very difficult to achieve- studies show success with dietary management in less than 10% of people. In  addition, substantial weight loss may require years of dietary control and may be difficult if patients have severe obesity. In these cases, surgical management may be considered.  Finally, older individuals who have tolerated obesity without health complications may be less likely to benefit from weight loss strategies.    Your BMI is Body mass index is 31.54 kg/m .    Weight management plan: Discussed healthy diet and exercise guidelines    Surgery:    Surgery for obstructive sleep apnea is considered generally only when other therapies fail to work. Surgery may be discussed with you if you are having a difficult time tolerating CPAP and or when there is an abnormal structure that requires surgical correction.  Nose and throat surgeries often enlarge the airway to prevent collapse.  Most of these surgeries create pain for 1-2 weeks and up to half of the most common surgeries are not effective throughout life.  You should carefully discuss the benefits and drawbacks to surgery with your sleep provider and surgeon to determine if it is the best solution for you.   More information  Surgery for JAYE is directed at areas that are responsible for narrowing or complete obstruction of the airway during sleep.  There are a wide range of procedures available to enlarge and/or stabilize the airway to prevent blockage of breathing in the three major areas where it can occur: the palate, tongue, and nasal regions.  Successful surgical treatment depends on the accurate identification of the factors responsible for obstructive sleep apnea in each person.  A personalized approach is required because there is no single treatment that works well for everyone.  Because of anatomic variation, consultation with an examination by a sleep surgeon is a critical first step in determining what surgical options are best for each patient.  In some cases, examination during sedation may be recommended in order to guide the selection of  procedures.  Patients will be counseled about risks and benefits as well as the typical recovery course after surgery. Surgery is typically not a cure for a person s JAYE.  However, surgery will often significantly improve one s JAYE severity (termed  success rate ).  Even in the absence of a cure, surgery will decrease the cardiovascular risk associated with OSA7; improve overall quality of life8 (sleepiness, functionality, sleep quality, etc).      Palate Procedures:  Patients with JAYE often have narrowing of their airway in the region of their tonsils and uvula.  The goals of palate procedures are to widen the airway in this region as well as to help the tissues resist collapse.  Modern palate procedure techniques focus on tissue conservation and soft tissue rearrangement, rather than tissue removal.  Often the uvula is preserved in this procedure. Residual sleep apnea is common in patient after pharyngoplasty with an average reduction in sleep apnea events of 33%2.      Tongue Procedures:  ExamWhile patients are awake, the muscles that surround the throat are active and keep this region open for breathing. These muscles relax during sleep, allowing the tongue and other structures to collapse and block breathing.  There are several different tongue procedures available.  Selection of a tongue base procedure depends on characteristics seen on physical exam.  Generally, procedures are aimed at removing bulky tissues in this area or preventing the back of the tongue from falling back during sleep.  Success rates for tongue surgery range from 50-62%3.    Hypoglossal Nerve Stimulation:  Hypoglossal nerve stimulation has recently received approval from the [place] Food and Drug Administration for the treatment of obstructive sleep apnea.  This is based on research showing that the system was safe and effective in treating sleep apnea6.  Results showed that the median AHI score decreased 68%, from 29.3 to 9.0. This  therapy uses an implant system that senses breathing patterns and delivers mild stimulation to airway muscles, which keeps the airway open during sleep.  The system consists of three fully implanted components: a small generator (similar in size to a pacemaker), a breathing sensor, and a stimulation lead.  Using a small handheld remote, a patient turns the therapy on before bed and off upon awakening.    Candidates for this device must be greater than 22 years of age, have moderate to severe JAYE (AHI between 20-65), BMI less than 32, have tried CPAP/oral appliance without success, and have appropriate upper airway anatomy (determined by a sleep endoscopy performed by Dr. Sanders).    Hypoglossal Nerve Stimulation Pathway:    The sleep surgeon s office will work with the patient through the insurance prior-authorization process (including communications and appeals).    Nasal Procedures:  Nasal obstruction can interfere with nasal breathing during the day and night.  Studies have shown that relief of nasal obstruction can improve the ability of some patients to tolerate positive airway pressure therapy for obstructive sleep apnea1.  Treatment options include medications such as nasal saline, topical corticosteroid and antihistamine sprays, and oral medications such as antihistamines or decongestants. Non-surgical treatments can include external nasal dilators for selected patients. If these are not successful by themselves, surgery can improve the nasal airway either alone or in combination with these other options.      Combination Procedures:  Combination of surgical procedures and other treatments may be recommended, particularly if patients have more than one area of narrowing or persistent positional disease.  The success rate of combination surgery ranges from 66-80%2,3.    References  Abigail WAGGONER. The Role of the Nose in Snoring and Obstructive Sleep Apnoea: An Update.  Eur Arch Otorhinolaryngol. 2011; 268:  1365-73.   Jaskaran SM; Mic JA; Bereket JR; Pallanch JF; Yossi MB; Juan C SG; Jean-Pierre GILES. Surgical modifications of the upper airway for obstructive sleep apnea in adults: a systematic review and meta-analysis. SLEEP 2010;33(10):2560-9868. Darrel GUAMAN. Hypopharyngeal surgery in obstructive sleep apnea: an evidence-based medicine review.  Arch Otolaryngol Head Neck Surg. 2006 Feb;132(2):206-13.  Kenji YH1, Honey Y, Julio CALVIN. The efficacy of anatomically based multilevel surgery for obstructive sleep apnea. Otolaryngol Head Neck Surg. 2003 Oct;129(4):327-35.  Darrel GUAMAN, Goldberg A. Hypopharyngeal Surgery in Obstructive Sleep Apnea: An Evidence-Based Medicine Review. Arch Otolaryngol Head Neck Surg. 2006 Feb;132(2):206-13.  Nieves GODINEZ et al. Upper-Airway Stimulation for Obstructive Sleep Apnea.  N Engl J Med. 2014 Jan 9;370(2):139-49.  Yvon Y et al. Increased Incidence of Cardiovascular Disease in Middle-aged Men with Obstructive Sleep Apnea. Am J Respir Crit Care Med; 2002 166: 159-165  Zuluaga EM et al. Studying Life Effects and Effectiveness of Palatopharyngoplasty (SLEEP) study: Subjective Outcomes of Isolated Uvulopalatopharyngoplasty. Otolaryngol Head Neck Surg. 2011; 144: 623-631.

## 2024-10-18 NOTE — ASSESSMENT & PLAN NOTE
HARMONY 21/28. Insomnia is likely multifactorial and possibly due to psychophysiological, circadian rhythm sleep wake disorder, paradoxical, anxiety/depression, undiagnosed JAYE, inadequate sleep hygiene, and/or lack of appropriate stimulus control  Patient reports chronic sleep initiation insomnia with progressively worsening maintenance insomnia  Initiation insomnia well controlled with trazodone   Will evaluate for JAYE as etiology of maintenance insomnia

## 2024-10-18 NOTE — ASSESSMENT & PLAN NOTE
STOP BANG score is 5/8. Patient likely has sleep apnea based on clinical history of snoring, EDS(ESS 6/24), witnessed apneas, neck circumference, gender, insomnia(HARMONY 21/28), snort arousals, nocturia, sleep inertia, and non-restorative sleep .   Obstructive sleep apnea reviewed. Complications of Untreated Sleep Apnea Reviewed.  HST/ Polysomnography reviewed. Information provided regarding treatment options for JAYE.  Recommend HST to assess for sleep disordered breathing due to high pretest probability for JAYE

## 2024-10-21 ENCOUNTER — OFFICE VISIT (OUTPATIENT)
Dept: SLEEP MEDICINE | Facility: CLINIC | Age: 49
End: 2024-10-21
Payer: COMMERCIAL

## 2024-10-21 DIAGNOSIS — G47.33 OBSTRUCTIVE SLEEP APNEA: ICD-10-CM

## 2024-10-21 PROCEDURE — 95800 SLP STDY UNATTENDED: CPT

## 2024-10-21 NOTE — PROGRESS NOTES
Pt is completing a home sleep test. Pt was instructed on how to put on the Noxturnal T3 device and associated equipment before going to bed and given the opportunity to practice putting it on before leaving the sleep center. Pt was reminded to bring the home sleep test kit back to the center tomorrow, at the scheduled time for download and reporting. Patient was instructed to complete study using the following treatment?  None  Neck circumference: 46 CM / 18 inches.  Device number: 18  Josy Camara CMA on 10/21/2024 at 10:00 AM

## 2024-10-22 ENCOUNTER — DOCUMENTATION ONLY (OUTPATIENT)
Dept: SLEEP MEDICINE | Facility: CLINIC | Age: 49
End: 2024-10-22
Payer: COMMERCIAL

## 2024-10-22 NOTE — PROGRESS NOTES
HST POST-STUDY QUESTIONNAIRE    What time did you go to bed?  10:45 pm  How long do you think it took to fall asleep?  15 min  What time did you wake up to start the day?  5:45 am  Did you get up during the night at all?  No  If you woke up, do you remember approximately what time(s)?   Did you have any difficulty with the equipment?  No  Did you us any type of treatment with this study?  None  Was the head of the bed elevated? No  Did you sleep in a recliner?  No  Did you stop using CPAP at least 3 days before this test?  NA  Any other information you'd like us to know?

## 2024-10-22 NOTE — NURSING NOTE
Pt returned HST device. It was downloaded and forwarded data to the clinical specialist for scoring.   Josy Camara CMA on 10/22/2024 at 11:14 AM

## 2024-10-31 ENCOUNTER — MYC MEDICAL ADVICE (OUTPATIENT)
Dept: SLEEP MEDICINE | Facility: CLINIC | Age: 49
End: 2024-10-31
Payer: COMMERCIAL

## 2024-10-31 DIAGNOSIS — G47.33 OBSTRUCTIVE SLEEP APNEA: Primary | ICD-10-CM

## 2024-10-31 DIAGNOSIS — F33.1 MODERATE EPISODE OF RECURRENT MAJOR DEPRESSIVE DISORDER (H): ICD-10-CM

## 2024-10-31 DIAGNOSIS — G47.00 INSOMNIA, UNSPECIFIED TYPE: ICD-10-CM

## 2024-10-31 NOTE — PROCEDURES
Home Sleep Study Interpretation    Patient: Yefri Vincent  MRN: [id number]  YOB: 1975  Study Date: 10/21/2024  PCP/Referring Provider: Kaylah Ruiz;  Ordering Provider: Cullen Patricia MD    Indications for Home Study: Yefri is a 48 Male with a history of MDD and DARÍO who presents with symptoms suggestive of obstructive sleep apnea      Weight: 239.0 lbs  BMI: 32.4   [place]:   STOP BAN/8      Data: A full night home sleep study was performed recording the standard physiologic parameters including body position, movement, sound, nasal pressure, thermal oral airflow, chest and abdominal movements with respiratory inductance plethysmography, and oxygen saturation by pulse oximetry. Pulse rate was estimated by oximetry recording. This study was considered adequate based on > 4 hours of quality oximetry and respiratory recording. As specified by the AASM Manual for the Scoring of Sleep and Associated events, version 2.3, Rule VIII.D 1B, 4% oxygen desaturation scoring for hypopneas is used as a standard of care on all home sleep apnea testing.    Analysis Time: 409.8 minutes    Respiration:  Sleep Associated Hypoxemia: Sustained hypoxemia was not present. Baseline oxygen saturation was 89%. Time with saturation less than 89% was 11.7 minutes. Time with saturation less than 90% was 18 minutes. The lowest oxygen saturation was 83.0%.  Snoring: Snoring was present 63% of the time with an average level of 73 dB. Duration of time snoring above 70 dB was 235.6 minutes.    Respiratory events: The home study revealed a presence of 3 obstructive apneas and 0 mixed and central apneas. There were 48 hypopneas resulting in a combined apnea/hypopnea index [AHI] of 7 events per hour with  83 per hour supine, 2  per hour prone, 0 per hour upright, 3  per hour left side, and 0 per hour right side.    Position: Percent of time spent: Supine 7%, prone 66%, upright 0%, on left 9%, on right 17%.    Heart  Rate: By Pulse Oximetry normal rate was noted.    Assessment:  Mild obstructive sleep apnea observed overall with significant exacerbation during supine sleep(supine AHI 82.5) compared to non-supine sleep(non-supine AHI ~3)  Sleep associated hypoxemia was present.    Recommendations:  Consider auto-CPAP at 05-15 cmH2O, oral appliance therapy, or positional therapy  Suggest optimizing sleep hygiene and avoiding sleep deprivation  Weight management    Diagnosis Code(s): Obstructive Sleep Apnea G47.33, Hypoxemia G47.36    Electronically signed by: Cullen Patricia MD October 31, 2024  Diplomate, American Board of Internal Medicine, Sleep Medicine

## 2024-11-04 NOTE — TELEPHONE ENCOUNTER
Patient has mild JAYE, mood disorder(MDD), and significant insomnia(HARMONY 21/28) and has elected to start CPAP therapy to manage their JAYE.     Cullen Patricia MD on 11/4/2024 at 7:47 AM  [facility]   Floor 1, [address]   [address] [place] [address]   Appointments: [phone]

## 2024-11-20 ENCOUNTER — DOCUMENTATION ONLY (OUTPATIENT)
Dept: SLEEP MEDICINE | Facility: CLINIC | Age: 49
End: 2024-11-20
Payer: COMMERCIAL

## 2024-11-20 DIAGNOSIS — G47.33 OBSTRUCTIVE SLEEP APNEA (ADULT) (PEDIATRIC): Primary | ICD-10-CM

## 2024-11-20 NOTE — PROGRESS NOTES
Patient was offered choice of vendor and chose [facility].  Patient Yefri Vincent was set up at [facility]  on November 20, 2024. Patient received a Resmed Airsense 11 Pressures were set at  5-15 cm H2O.   Patient s ramp is 5 cm H2O for Off and FLEX/EPR is EPR, 2.  Patient received a Resmed Mask name: Airfit P30i  Pillow mask size Medium, heated tubing and heated humidifier.  Patient has the following compliance requirements: none  Patient has a follow up on 1/21/25 with Dr Jarek Hurst

## 2024-11-25 ENCOUNTER — APPOINTMENT (OUTPATIENT)
Dept: SLEEP MEDICINE | Facility: CLINIC | Age: 49
End: 2024-11-25
Payer: COMMERCIAL

## 2025-01-22 DIAGNOSIS — F51.01 PRIMARY INSOMNIA: ICD-10-CM

## 2025-01-22 DIAGNOSIS — F41.1 GENERALIZED ANXIETY DISORDER: ICD-10-CM

## 2025-01-22 RX ORDER — VENLAFAXINE HYDROCHLORIDE 75 MG/1
75 CAPSULE, EXTENDED RELEASE ORAL DAILY
Qty: 90 CAPSULE | Refills: 0 | Status: SHIPPED | OUTPATIENT
Start: 2025-01-22

## 2025-01-22 RX ORDER — VENLAFAXINE HYDROCHLORIDE 150 MG/1
150 CAPSULE, EXTENDED RELEASE ORAL DAILY
Qty: 90 CAPSULE | Refills: 0 | Status: SHIPPED | OUTPATIENT
Start: 2025-01-22

## 2025-01-22 RX ORDER — TRAZODONE HYDROCHLORIDE 50 MG/1
75 TABLET, FILM COATED ORAL AT BEDTIME
Qty: 135 TABLET | Refills: 0 | Status: SHIPPED | OUTPATIENT
Start: 2025-01-22

## 2025-01-29 ENCOUNTER — OFFICE VISIT (OUTPATIENT)
Dept: SLEEP MEDICINE | Facility: CLINIC | Age: 50
End: 2025-01-29
Payer: COMMERCIAL

## 2025-01-29 VITALS
SYSTOLIC BLOOD PRESSURE: 112 MMHG | BODY MASS INDEX: 31.62 KG/M2 | HEIGHT: 73 IN | WEIGHT: 238.6 LBS | HEART RATE: 63 BPM | OXYGEN SATURATION: 99 % | DIASTOLIC BLOOD PRESSURE: 70 MMHG

## 2025-01-29 DIAGNOSIS — G47.33 OSA ON CPAP: Primary | ICD-10-CM

## 2025-01-29 DIAGNOSIS — F41.1 GENERALIZED ANXIETY DISORDER: ICD-10-CM

## 2025-01-29 PROCEDURE — 99213 OFFICE O/P EST LOW 20 MIN: CPT | Performed by: STUDENT IN AN ORGANIZED HEALTH CARE EDUCATION/TRAINING PROGRAM

## 2025-01-29 RX ORDER — CLONAZEPAM 1 MG/1
1 TABLET ORAL 2 TIMES DAILY PRN
Qty: 60 TABLET | Refills: 1 | Status: SHIPPED | OUTPATIENT
Start: 2025-01-29

## 2025-01-29 ASSESSMENT — SLEEP AND FATIGUE QUESTIONNAIRES
HOW LIKELY ARE YOU TO NOD OFF OR FALL ASLEEP WHILE SITTING AND READING: WOULD NEVER DOZE
HOW LIKELY ARE YOU TO NOD OFF OR FALL ASLEEP WHILE SITTING INACTIVE IN A PUBLIC PLACE: WOULD NEVER DOZE
HOW LIKELY ARE YOU TO NOD OFF OR FALL ASLEEP WHILE WATCHING TV: WOULD NEVER DOZE
HOW LIKELY ARE YOU TO NOD OFF OR FALL ASLEEP WHEN YOU ARE A PASSENGER IN A CAR FOR AN HOUR WITHOUT A BREAK: WOULD NEVER DOZE
HOW LIKELY ARE YOU TO NOD OFF OR FALL ASLEEP IN A CAR, WHILE STOPPED FOR A FEW MINUTES IN TRAFFIC: WOULD NEVER DOZE
HOW LIKELY ARE YOU TO NOD OFF OR FALL ASLEEP WHILE SITTING AND TALKING TO SOMEONE: WOULD NEVER DOZE
HOW LIKELY ARE YOU TO NOD OFF OR FALL ASLEEP WHILE SITTING QUIETLY AFTER LUNCH WITHOUT ALCOHOL: WOULD NEVER DOZE
HOW LIKELY ARE YOU TO NOD OFF OR FALL ASLEEP WHILE LYING DOWN TO REST IN THE AFTERNOON WHEN CIRCUMSTANCES PERMIT: SLIGHT CHANCE OF DOZING

## 2025-01-29 NOTE — PROGRESS NOTES
[facility]     Sleep clinic follow up visit note    Date on this visit: 1/29/2025    Primary Physician: Kaylah Ruiz     History of present illness:  Yefri Vincent is a 49 year old male patient with MDD & DARÍO  who presents for CPAP Follow Up (CPAP follow up, mask keeps coming off)     He has mild sleep apnea with an AHI of 7.5, managed with CPAP.     Do you use a CPAP Machine at home: (Patient-Rptd) Yes  DME: [facility], set up at [place]  on November 20, 2024.  Overall, on a scale of 0-10 how would you rate your CPAP (0 poor, 10 great): (Patient-Rptd) 5    What type of mask do you use: (Patient-Rptd) Nasal Pillow  Is your mask comfortable: (Patient-Rptd) Yes  If not, why:      Is your mask leaking: (Patient-Rptd) No  If yes, where do you feel it:    How many night per week does the mask leak (0-7):      Do you notice snoring with mask on: (Patient-Rptd) No  Do you notice gasping arousals with mask on: (Patient-Rptd) No  Are you having significant oral or nasal dryness: (Patient-Rptd) No  Is the pressure setting comfortable: (Patient-Rptd) Yes  If not, why:      What is your typical bedtime: (Patient-Rptd) 11  How long does it take you to go to sleep on PAP therapy: (Patient-Rptd) 15  What time do you typically get out of bed for the day: (Patient-Rptd) 6  How many hours on average per night are you using PAP therapy: (Patient-Rptd) 4  How many hours are you sleeping per night: (Patient-Rptd) 6  Do you feel well rested in the morning: (Patient-Rptd) No    ResMed   Auto-PAP 5.0 - 15.0 cmH2O 30 day usage data:    46% of days with > 4 hours of use. 9/30 days with no use.   Average use 198 minutes per day.   95%ile Leak 20.07 L/min.   CPAP 95% pressure 11.7 cm.   AHI 1 events per hour.          Assessment and Plan:  Problem List Items Addressed This Visit       JAYE on CPAP - Primary     Yefri Vincent has Mild Sleep apnea. He is tolerating PAP well and reports adequate compliance with PAP therapy. Daytime  symptoms are improved and reports positive benefits with PAP use.   JAYE is adequately controlled with Auto CPAP at the current settings per compliance DL.   Prescription provided for renewal of PAP supplies.  Recommended him/her to continue using the CPAP regularly during sleep and instructed  to get  the supplies for the PAP replaced regularly.    Patient instructed to remember to bring PAP with him/her if hospitalized and if anticipating procedure that requires sedation/surgery to be sure to discuss with the provider/surgeon that he/she has sleep apnea and uses PAP therapy.          Relevant Orders    Comprehensive DME (Completed)       SCALES:  EPWORTH SLEEPINESS SCALE       1/29/2025     9:40 AM    [place] Sleepiness Scale ( JULIAN Mae  [id number]<br>ESS - [place]/English - Final version - 21 Nov 07 - [facility] [place].)   Sitting and reading Would never doze   Watching TV Would never doze   Sitting, inactive in a public place (e.g. a theatre or a meeting) Would never doze   As a passenger in a car for an hour without a break Would never doze   Lying down to rest in the afternoon when circumstances permit Slight chance of dozing   Sitting and talking to someone Would never doze   Sitting quietly after a lunch without alcohol Would never doze   In a car, while stopped for a few minutes in traffic Would never doze   [place] Score (MC) 1   [place] Score (Sleep) 1        Patient-reported       INSOMNIA SEVERITY INDEX (HARMONY)        1/29/2025     9:34 AM   Insomnia Severity Index (HARMONY)   Difficulty falling asleep 0   Difficulty staying asleep 1   Problems waking up too early 2   How SATISFIED/DISSATISFIED are you with your CURRENT sleep pattern? 2   How NOTICEABLE to others do you think your sleep problem is in terms of impairing the quality of your life? 1   How WORRIED/DISTRESSED are you about your current sleep problem? 0   To what extent do you consider your sleep problem to INTERFERE with your daily functioning  (e.g. daytime fatigue, mood, ability to function at work/daily chores, concentration, memory, mood, etc.) CURRENTLY? 2   HARMONY Total Score 8        Patient-reported     Guidelines for Scoring/Interpretation:  Total score categories:  0-7 = No clinically significant insomnia   8-14 = Subthreshold insomnia   15-21 = Clinical insomnia (moderate severity)  22-28 = Clinical insomnia (severe)  Used via courtesy of www.AchaLaealth.va.gov with permission from Rolly Shaw PhD., [facility]      Allergies:    No Known Allergies    Medications:    Current Outpatient Medications   Medication Sig Dispense Refill    celecoxib (CELEBREX) 100 MG capsule Take 1 capsule (100 mg) by mouth 2 times daily 180 capsule 2    clonazePAM (KLONOPIN) 1 MG tablet Take 1 tablet by mouth 2 times daily as needed for anxiety 60 tablet 5    traZODone (DESYREL) 50 MG tablet Take 1.5 tablets (75 mg) by mouth at bedtime 135 tablet 0    venlafaxine (EFFEXOR XR) 150 MG 24 hr capsule Take 1 capsule (150 mg) by mouth daily 90 capsule 0    venlafaxine (EFFEXOR XR) 75 MG 24 hr capsule Take 1 capsule (75 mg) by mouth daily 90 capsule 0       Problem List:  Patient Active Problem List    Diagnosis Date Noted    JAYE on CPAP 10/18/2024     Priority: Medium    Lumbar pain 08/02/2022     Priority: Medium    Anxiety 03/29/2022     Priority: Medium    Insomnia 03/29/2022     Priority: Medium    Moderate episode of recurrent major depressive disorder (H) 03/29/2022     Priority: Medium    Obesity 03/29/2022     Priority: Medium        Past Medical/Surgical History:  No past medical history on file.  Past Surgical History:   Procedure Laterality Date    ARTHROSCOPY SHOULDER ROTATOR CUFF REPAIR  11/2009       Social History:  Social History     Socioeconomic History    Marital status:      Spouse name: Not on file    Number of children: Not on file    Years of education: Not on file    Highest education level: Not on file   Occupational History    Not on  file   Tobacco Use    Smoking status: Former     Current packs/day: 0.00     Types: Cigarettes     Quit date: 2018     Years since quittin.0     Passive exposure: Past    Smokeless tobacco: Never   Vaping Use    Vaping status: Some Days    Passive vaping exposure: Yes   Substance and Sexual Activity    Alcohol use: Not on file     Comment: occasionally    Drug use: Never    Sexual activity: Not on file   Other Topics Concern    Not on file   Social History Narrative    Not on file     Social Drivers of Health     Financial Resource Strain: Low Risk  (2024)    Financial Resource Strain     Within the past 12 months, have you or your family members you live with been unable to get utilities (heat, electricity) when it was really needed?: No   Food Insecurity: Low Risk  (2024)    Food Insecurity     Within the past 12 months, did you worry that your food would run out before you got money to buy more?: No     Within the past 12 months, did the food you bought just not last and you didn t have money to get more?: No   Transportation Needs: Low Risk  (2024)    Transportation Needs     Within the past 12 months, has lack of transportation kept you from medical appointments, getting your medicines, non-medical meetings or appointments, work, or from getting things that you need?: No   Physical Activity: Not on file   Stress: Not on file   Social Connections: Unknown (2022)    Received from [facility], [facility]    Social Connections     Frequency of Communication with Friends and Family: Not on file   Interpersonal Safety: Low Risk  (2024)    Interpersonal Safety     Do you feel physically and emotionally safe where you currently live?: Yes     Within the past 12 months, have you been hit, slapped, kicked or otherwise physically hurt by someone?: No     Within the past 12 months, have you been humiliated or emotionally abused in  "other ways by your partner or ex-partner?: No   Housing Stability: Low Risk  (1/8/2024)    Housing Stability     Do you have housing? : Yes     Are you worried about losing your housing?: No       Family History:  Family History   Problem Relation Age of Onset    Cerebrovascular Disease Maternal Grandmother     Lung Cancer Maternal Grandfather     Diabetes Type 1 Maternal Grandfather        Review of systems  A complete review of systems reviewed by me is negative with the exeption of what has been mentioned in the history of present illness.    Physical Examination:  Vitals: /70   Pulse 63   Ht 1.854 m (6' 0.99\")   Wt 108.2 kg (238 lb 9.6 oz)   SpO2 99%   BMI 31.49 kg/m    BMI= Body mass index is 31.49 kg/m .     GENERAL: alert and no distress  EYES: Eyes grossly normal to inspection.  No discharge or erythema, or obvious scleral/conjunctival abnormalities.  RESP: No audible wheeze, cough, or visible cyanosis.    SKIN: Visible skin clear. No significant rash, abnormal pigmentation or lesions.  NEURO: Cranial nerves grossly intact.  Mentation and speech appropriate for age.  PSYCH: Appropriate affect, tone, and pace of words          Other tests/labs:   I have reviewed the labs and personally reviewed the imaging below and made my comment in the assessment and plan.        Patient was strongly advised to avoid driving, operating any heavy machinery or other hazardous situations while drowsy or sleepy.  Patient was counseled on the importance of driving while alert, to pull over if drowsy, or nap before getting into the vehicle if sleepy.      Plan is for Yefri Vincent to follow up in about 12 month(s).     The above note was dictated using voice recognition software. Although reviewed after completion, some word and grammatical error may remain . Please contact the author for any clarifications.    Total time spent reviewing medical records, history and physical examination, review of previous testing " and interpretation as well as documentation on this date, 01/29/25: 10 minutes    Cullen Patricia MD on 01/29/25  [facility]   Floor 1, [address]   [address] [place] [address]   Appointments: [phone]     CC: Cullen Patricia

## 2025-01-29 NOTE — PATIENT INSTRUCTIONS
MY INFORMATION ON SLEEP APNEA-  Yefri Vincent    DOCTOR : Cullen Patricia MD  SLEEP CENTER :      MY CONTACT NUMBER:    [facility]  [phone]  [facility]      For general sleep health questions:   http://sleepeducation.org    For tips about PAP and COVID-19:  https://www.thoracic.org/patients/patient-resources/resources/covid-19-and-home-pap-therapy.pdf    For general info about COVID-19 including vaccines:  https://[employer]moo.org/covid19      Continue PAP therapy every night, for all hours that you are sleeping (including naps.)  As always, try to get at least 8 hours of sleep or more each day, keep a regular sleep schedule, and avoid sleep deprivation. Avoid alcohol.  Reasons that you might need a change to your pressure therapy would be weight gain or loss, waking having inadvertently removed your PAP overnight, having previously felt refreshed by sleep with CPAP use and now waking un-refreshed, and return of daytime sleepiness. Also, the development of new medical problems  (such as heart failure, stroke, medications such as narcotics) can sometimes affect breathing at night and change your PAP therapy needs.  Please bring PAP with you if you are hospitalized.  If anticipating surgery be sure to discuss with your surgeon that you have sleep apnea and use PAP therapy.    Maintain your equipment as recommended which includes routine cleaning and replacement of supplies.      Call DME for any questions regarding supplies or maintenance.    [facility], [facility] [phone]    Do not drive on engage in potentially dangerous activities if feeling sleepy.  Please follow up in sleep clinic again in 12 months.        Tips for your PAP use-    Mask fitting tips  Mask fitting exercise:    To improve your mask seal and your mobility at night, put mask on and secure in place.  Lie down in bed with full pressure and  roll to one side, adjust headgear while in that position to eliminate any leaks. Repeat process rolling to other side.     The mask seal does not have to be perfect:   CPAP machines are designed to make up for small leaks. However, you will not tolerate leaks blowing in your eyes so you will need to adjust.   Any leak should only be near or at the bottom of the mask.  We expect your mask to leak slightly at night.    Do not over-tighten the headgear straps, tighter IS NOT better, we expect minimal leak.    First try re-positioning the mask or headgear before tightening the headgear straps.  Mask leaks are expected due to changing sleeping positions. Try pulling the mask away from your skin allowing the cushion to re-inflate will minimize the leak.  If you struggle for a good fit, try turning the CPAP off and then readjust the mask by pulling it away from your face and then turning back on the CPAP.        Humidifier tips  Humidifiers can be adjusted to increase or decrease the amount of moisture according to your comfort level. You may need to adjust this frequently at first, but then might only change it with seasonal weather changes.     Try INCREASING the humidity if:  You experience a dry, irritated nasal passage or throat.  You have a runny, drippy nose or sneezing fits after using CPAP.  You experience nasal congestion during or after CPAP use.    Try DECREASING the humidity if:  You have excessive condensation or  rain out  in the tubing or mask.  Otherwise keep the tubing warm during the night by running it underneath the blankets or pillow.      Clinic visit after initial PAP set-up   Bring your equipment with you to your 5-8 week follow up clinic visit.  We will be extracting your data from the machine if not available from the cloud based [employer].        Travel  Always take your equipment with you when you travel.  If you fly with your equipment bring it on with you as a carry on.  Medical equipment does  not count as a carry on.    If you travel international the machines take 110-240v.  The only adapter needed is the adapter that will fit into the receptacle (outlet).    You may also want to bring an extension cord as many hotel rooms have limited outlets at the bedside.  Do not travel with water in your humidifier chamber.     Cleaning and Maintenance Guidelines    Equipment Frequency Cleaning Method   Mask First Day    Daily      Weekly Soak mask in hot soapy water for 30 minutes, rinse and air dry.  Wipe nasal cushion with a hot soapy (Ivory, baby shampoo) cloth and rinse.  Baby wipes may also be used.  Do not use anti-bacterial soaps,Gianna  liquid soap, rubbing alcohol, bleach or ammonia.  Wash frame in hot soapy water (Ivory, baby shampoo) rinse and let air dry   Headgear Biweekly Wash in hot soapy water, rinse and air dry   Reusable Gray Filter Weekly Wash in hot soapy water, rinse, put in towel squeeze moisture out, let air dry   Disposable White Filter Check Weekly Replace when brown or gray in color; at least every 2 to 3 months   Humidifier Chamber Daily    Weekly Empty distilled water from humidifier and let air dry    Hand wash in hot soapy water, rinse and air dry   Tubing Weekly Wash in hot soapy water, rinse and let air dry   Mask, Tubing and Humidifier Chamber As needed Disinfect: Soak in 1 part distilled white vinegar to 3 parts hot water for 30 minutes, rinse well and air dry  Not the material headgear        MASK AND SUPPLY REORDERING and EQUIPMENT NEEDS through your DME and per your insurance  Reminder: Most insurance companies will allow for a new mask, headgear, tubing, and reusable gray filter every six months.  Disposable white ultra-fine filters are covered monthly.      HOME AND SAFETY INSTRUCTIONS  Do not use frayed or cracked electrical cords, multi plug adaptors, or switched receptacles  Do not immerse electrical equipment into water  Assure that electrical cords do not become a tripping  hazard

## 2025-01-29 NOTE — ASSESSMENT & PLAN NOTE
Yefri Vincent has Mild Sleep apnea. He is tolerating PAP well and reports adequate compliance with PAP therapy. Daytime symptoms are improved and reports positive benefits with PAP use.   JAYE is adequately controlled with Auto CPAP at the current settings per compliance DL.   Prescription provided for renewal of PAP supplies.  Recommended him/her to continue using the CPAP regularly during sleep and instructed  to get  the supplies for the PAP replaced regularly.    Patient instructed to remember to bring PAP with him/her if hospitalized and if anticipating procedure that requires sedation/surgery to be sure to discuss with the provider/surgeon that he/she has sleep apnea and uses PAP therapy.

## 2025-03-03 ENCOUNTER — OFFICE VISIT (OUTPATIENT)
Dept: FAMILY MEDICINE | Facility: CLINIC | Age: 50
End: 2025-03-03
Payer: COMMERCIAL

## 2025-03-03 VITALS
HEART RATE: 66 BPM | WEIGHT: 237.8 LBS | DIASTOLIC BLOOD PRESSURE: 83 MMHG | OXYGEN SATURATION: 98 % | HEIGHT: 73 IN | BODY MASS INDEX: 31.51 KG/M2 | SYSTOLIC BLOOD PRESSURE: 126 MMHG

## 2025-03-03 DIAGNOSIS — M54.50 CHRONIC LOW BACK PAIN, UNSPECIFIED BACK PAIN LATERALITY, UNSPECIFIED WHETHER SCIATICA PRESENT: ICD-10-CM

## 2025-03-03 DIAGNOSIS — Z00.00 ROUTINE GENERAL MEDICAL EXAMINATION AT A HEALTH CARE FACILITY: Primary | ICD-10-CM

## 2025-03-03 DIAGNOSIS — G89.29 CHRONIC LOW BACK PAIN, UNSPECIFIED BACK PAIN LATERALITY, UNSPECIFIED WHETHER SCIATICA PRESENT: ICD-10-CM

## 2025-03-03 DIAGNOSIS — Z13.1 SCREENING FOR DIABETES MELLITUS: ICD-10-CM

## 2025-03-03 DIAGNOSIS — Z51.81 ENCOUNTER FOR THERAPEUTIC DRUG MONITORING: ICD-10-CM

## 2025-03-03 DIAGNOSIS — Z13.6 SCREENING FOR CARDIOVASCULAR CONDITION: ICD-10-CM

## 2025-03-03 DIAGNOSIS — F41.1 GENERALIZED ANXIETY DISORDER: ICD-10-CM

## 2025-03-03 DIAGNOSIS — F51.01 PRIMARY INSOMNIA: ICD-10-CM

## 2025-03-03 LAB
ANION GAP SERPL CALCULATED.3IONS-SCNC: 12 MMOL/L (ref 7–15)
BUN SERPL-MCNC: 21 MG/DL (ref 6–20)
CALCIUM SERPL-MCNC: 9.3 MG/DL (ref 8.8–10.4)
CHLORIDE SERPL-SCNC: 101 MMOL/L (ref 98–107)
CHOLEST SERPL-MCNC: 248 MG/DL
CREAT SERPL-MCNC: 1.16 MG/DL (ref 0.67–1.17)
EGFRCR SERPLBLD CKD-EPI 2021: 77 ML/MIN/1.73M2
FASTING STATUS PATIENT QL REPORTED: ABNORMAL
FASTING STATUS PATIENT QL REPORTED: ABNORMAL
GLUCOSE SERPL-MCNC: 105 MG/DL (ref 70–99)
HCO3 SERPL-SCNC: 26 MMOL/L (ref 22–29)
HDLC SERPL-MCNC: 40 MG/DL
LDLC SERPL CALC-MCNC: ABNORMAL MG/DL
LDLC SERPL DIRECT ASSAY-MCNC: 153 MG/DL
NONHDLC SERPL-MCNC: 208 MG/DL
POTASSIUM SERPL-SCNC: 4.1 MMOL/L (ref 3.4–5.3)
SODIUM SERPL-SCNC: 139 MMOL/L (ref 135–145)
TRIGL SERPL-MCNC: 429 MG/DL

## 2025-03-03 PROCEDURE — G2211 COMPLEX E/M VISIT ADD ON: HCPCS | Performed by: FAMILY MEDICINE

## 2025-03-03 PROCEDURE — 3079F DIAST BP 80-89 MM HG: CPT | Performed by: FAMILY MEDICINE

## 2025-03-03 PROCEDURE — 80048 BASIC METABOLIC PNL TOTAL CA: CPT | Performed by: FAMILY MEDICINE

## 2025-03-03 PROCEDURE — 99396 PREV VISIT EST AGE 40-64: CPT | Performed by: FAMILY MEDICINE

## 2025-03-03 PROCEDURE — 99214 OFFICE O/P EST MOD 30 MIN: CPT | Mod: 25 | Performed by: FAMILY MEDICINE

## 2025-03-03 PROCEDURE — 36415 COLL VENOUS BLD VENIPUNCTURE: CPT | Performed by: FAMILY MEDICINE

## 2025-03-03 PROCEDURE — 80061 LIPID PANEL: CPT | Performed by: FAMILY MEDICINE

## 2025-03-03 PROCEDURE — 83721 ASSAY OF BLOOD LIPOPROTEIN: CPT | Mod: 59 | Performed by: FAMILY MEDICINE

## 2025-03-03 PROCEDURE — 3074F SYST BP LT 130 MM HG: CPT | Performed by: FAMILY MEDICINE

## 2025-03-03 RX ORDER — CLONAZEPAM 1 MG/1
1 TABLET ORAL 2 TIMES DAILY PRN
Qty: 60 TABLET | Refills: 5 | Status: SHIPPED | OUTPATIENT
Start: 2025-03-03

## 2025-03-03 RX ORDER — VENLAFAXINE HYDROCHLORIDE 150 MG/1
150 CAPSULE, EXTENDED RELEASE ORAL DAILY
Qty: 90 CAPSULE | Refills: 4 | Status: SHIPPED | OUTPATIENT
Start: 2025-03-03

## 2025-03-03 RX ORDER — TRAZODONE HYDROCHLORIDE 50 MG/1
75 TABLET ORAL AT BEDTIME
Qty: 135 TABLET | Refills: 4 | Status: SHIPPED | OUTPATIENT
Start: 2025-03-03

## 2025-03-03 RX ORDER — VENLAFAXINE HYDROCHLORIDE 75 MG/1
75 CAPSULE, EXTENDED RELEASE ORAL DAILY
Qty: 90 CAPSULE | Refills: 4 | Status: SHIPPED | OUTPATIENT
Start: 2025-03-03

## 2025-03-03 RX ORDER — CELECOXIB 100 MG/1
100 CAPSULE ORAL 2 TIMES DAILY
Qty: 180 CAPSULE | Refills: 3 | Status: SHIPPED | OUTPATIENT
Start: 2025-03-03

## 2025-03-03 SDOH — HEALTH STABILITY: PHYSICAL HEALTH: ON AVERAGE, HOW MANY DAYS PER WEEK DO YOU ENGAGE IN MODERATE TO STRENUOUS EXERCISE (LIKE A BRISK WALK)?: 1 DAY

## 2025-03-03 ASSESSMENT — PATIENT HEALTH QUESTIONNAIRE - PHQ9
SUM OF ALL RESPONSES TO PHQ QUESTIONS 1-9: 1
10. IF YOU CHECKED OFF ANY PROBLEMS, HOW DIFFICULT HAVE THESE PROBLEMS MADE IT FOR YOU TO DO YOUR WORK, TAKE CARE OF THINGS AT HOME, OR GET ALONG WITH OTHER PEOPLE: NOT DIFFICULT AT ALL
SUM OF ALL RESPONSES TO PHQ QUESTIONS 1-9: 1

## 2025-03-03 ASSESSMENT — SOCIAL DETERMINANTS OF HEALTH (SDOH): HOW OFTEN DO YOU GET TOGETHER WITH FRIENDS OR RELATIVES?: PATIENT DECLINED

## 2025-03-03 NOTE — PATIENT INSTRUCTIONS
Patient Education   Preventive Care Advice   This is general advice given by our system to help you stay healthy. However, your care team may have specific advice just for you. Please talk to your care team about your preventive care needs.  Nutrition  Eat 5 or more servings of fruits and vegetables each day.  Try wheat bread, brown rice and whole grain pasta (instead of white bread, rice, and pasta).  Get enough calcium and vitamin D. Check the label on foods and aim for 100% of the RDA (recommended daily allowance).  Lifestyle  Exercise at least 150 minutes each week  (30 minutes a day, 5 days a week).  Do muscle strengthening activities 2 days a week. These help control your weight and prevent disease.  No smoking.  Wear sunscreen to prevent skin cancer.  Have a dental exam and cleaning every 6 months.  Yearly exams  See your health care team every year to talk about:  Any changes in your health.  Any medicines your care team has prescribed.  Preventive care, family planning, and ways to prevent chronic diseases.  Shots (vaccines)   HPV shots (up to age 26), if you've never had them before.  Hepatitis B shots (up to age 59), if you've never had them before.  COVID-19 shot: Get this shot when it's due.  Flu shot: Get a flu shot every year.  Tetanus shot: Get a tetanus shot every 10 years.  Pneumococcal, hepatitis A, and RSV shots: Ask your care team if you need these based on your risk.  Shingles shot (for age 50 and up)  General health tests  Diabetes screening:  Starting at age 35, Get screened for diabetes at least every 3 years.  If you are younger than age 35, ask your care team if you should be screened for diabetes.  Cholesterol test: At age 39, start having a cholesterol test every 5 years, or more often if advised.  Bone density scan (DEXA): At age 50, ask your care team if you should have this scan for osteoporosis (brittle bones).  Hepatitis C: Get tested at least once in your life.  STIs (sexually  transmitted infections)  Before age 24: Ask your care team if you should be screened for STIs.  After age 24: Get screened for STIs if you're at risk. You are at risk for STIs (including HIV) if:  You are sexually active with more than one person.  You don't use condoms every time.  You or a partner was diagnosed with a sexually transmitted infection.  If you are at risk for HIV, ask about PrEP medicine to prevent HIV.  Get tested for HIV at least once in your life, whether you are at risk for HIV or not.  Cancer screening tests  Cervical cancer screening: If you have a cervix, begin getting regular cervical cancer screening tests starting at age 21.  Breast cancer scan (mammogram): If you've ever had breasts, begin having regular mammograms starting at age 40. This is a scan to check for breast cancer.  Colon cancer screening: It is important to start screening for colon cancer at age 45.  Have a colonoscopy test every 10 years (or more often if you're at risk) Or, ask your provider about stool tests like a FIT test every year or Cologuard test every 3 years.  To learn more about your testing options, visit:   .  For help making a decision, visit:   https://bit.ly/xd40135.  Prostate cancer screening test: If you have a prostate, ask your care team if a prostate cancer screening test (PSA) at age 55 is right for you.  Lung cancer screening: If you are a current or former smoker ages 50 to 80, ask your care team if ongoing lung cancer screenings are right for you.  For informational purposes only. Not to replace the advice of your health care provider. Copyright   2023 [employer]. All rights reserved. Clinically reviewed by the [facility] Transitions Program. [employer] [id number] - REV 01/24.

## 2025-03-03 NOTE — PROGRESS NOTES
Preventive Care Visit  [facility]  Kaylah Ruiz MD, Family Medicine  Mar 3, 2025      Assessment & Plan     Routine general medical examination at a health care facility  Health maintenance updated, preventive services reviewed, vaccines discussed, questions are answered, patient encouraged to continue annual wellness visits to review preventive services    Screening for diabetes mellitus  Labs today  - Basic metabolic panel; Future  - Basic metabolic panel    Screening for cardiovascular condition  Labs today  - Lipid panel reflex to direct LDL Non-fasting; Future  - Basic metabolic panel; Future  - Lipid panel reflex to direct LDL Non-fasting  - Basic metabolic panel    Encounter for therapeutic drug monitoring  Labs today, check creatinine given use of Celebrex regularly  - Basic metabolic panel; Future  - Basic metabolic panel    Chronic low back pain, unspecified back pain laterality, unspecified whether sciatica present  Well-controlled on current regimen  - celecoxib (CELEBREX) 100 MG capsule; Take 1 capsule (100 mg) by mouth 2 times daily.    Generalized anxiety disorder  Well-controlled on current regimen, uses clonazepam 2-3 times a week.  Understands more frequent use could lead to increased tolerance and less effectiveness.  - clonazePAM (KLONOPIN) 1 MG tablet; Take 1 tablet (1 mg) by mouth 2 times daily as needed for anxiety.  - venlafaxine (EFFEXOR XR) 150 MG 24 hr capsule; Take 1 capsule (150 mg) by mouth daily.  - venlafaxine (EFFEXOR XR) 75 MG 24 hr capsule; Take 1 capsule (75 mg) by mouth daily.    Primary insomnia  Stable on current regimen  - traZODone (DESYREL) 50 MG tablet; Take 1.5 tablets (75 mg) by mouth at bedtime.        The longitudinal plan of care for the diagnosis(es)/condition(s) as documented were addressed during this visit. Due to the added complexity in care, I will continue to support Yefri in the subsequent management and with ongoing continuity  of care.        Counseling  Appropriate preventive services were addressed with this patient via screening, questionnaire, or discussion as appropriate for fall prevention, nutrition, physical activity, Tobacco-use cessation, social engagement, weight loss and cognition.  Checklist reviewing preventive services available has been given to the patient.  Reviewed patient's diet, addressing concerns and/or questions.   He is at risk for lack of exercise and has been provided with information to increase physical activity for the benefit of his well-being.           Hiral Asif is a 49 year old, presenting for the following:  Physical (Fasting. )        3/3/2025     7:26 AM   Additional Questions   Roomed by Ashley   Accompanied by N/A          HPI  Here for annual exam and med refills.  Doing well.  No concerns.  Health maintenance reviewed.  No side effects from medication.         Advance Care Planning  Patient does not have a Health Care Directive: Discussed advance care planning with patient; however, patient declined at this time.      3/3/2025   General Health   How would you rate your overall physical health? (!) FAIR   Feel stress (tense, anxious, or unable to sleep) Only a little   (!) STRESS CONCERN      3/3/2025   Nutrition   Three or more servings of calcium each day? (!) NO   Diet: Regular (no restrictions)   How many servings of fruit and vegetables per day? (!) 0-1   How many sweetened beverages each day? 0-1         3/3/2025   Exercise   Days per week of moderate/strenous exercise 1 day   (!) EXERCISE CONCERN      3/3/2025   Social Factors   Frequency of gathering with friends or relatives Patient declined   Worry food won't last until get money to buy more No   Food not last or not have enough money for food? No   Do you have housing? (Housing is defined as stable permanent housing and does not include staying ouside in a car, in a tent, in an abandoned building, in an overnight shelter, or  "couch-surfing.) Yes   Are you worried about losing your housing? No   Lack of transportation? No   Unable to get utilities (heat,electricity)? No         3/3/2025   Fall Risk   Fallen 2 or more times in the past year? No   Trouble with walking or balance? No          3/3/2025   Dental   Dentist two times every year? Yes          Today's PHQ-9 Score:       3/3/2025     7:21 AM   PHQ-9 SCORE   PHQ-9 Total Score MyChart 1 (Minimal depression)   PHQ-9 Total Score 1        Patient-reported         3/3/2025   Substance Use   Alcohol more than 3/day or more than 7/wk No   Do you use any other substances recreationally? No     Social History     Tobacco Use    Smoking status: Former     Current packs/day: 0.00     Types: Cigarettes     Quit date:      Years since quittin.1     Passive exposure: Past    Smokeless tobacco: Never   Vaping Use    Vaping status: Some Days    Devices: Pre-filled or refillable cartridge, Refillable tank    Passive vaping exposure: Yes   Substance Use Topics    Drug use: Never           3/3/2025   STI Screening   New sexual partner(s) since last STI/HIV test? No   ASCVD Risk   The 10-year ASCVD risk score (Jahaira KINGSLEY, et al., 2019) is: 3.8%*    Values used to calculate the score:      Age: 49 years      Sex: Male      Is Non- : No      Diabetic: No      Tobacco smoker: No      Systolic Blood Pressure: 126 mmHg      Is BP treated: No      HDL Cholesterol: 39 mg/dL      Total Cholesterol: 4.7 mmol/L*      * - Cholesterol units were assumed for this score calculation            3/3/2025   Contraception/Family Planning   Questions about contraception or family planning No        Reviewed and updated as needed this visit by Provider   Tobacco  Allergies  Meds  Problems  Med Hx  Surg Hx  Fam Hx                     Objective    Exam  /83   Pulse 66   Ht 1.844 m (6' 0.6\")   Wt 107.9 kg (237 lb 12.8 oz)   SpO2 98%   BMI 31.72 kg/m     Estimated body " "mass index is 31.72 kg/m  as calculated from the following:    Height as of this encounter: 1.844 m (6' 0.6\").    Weight as of this encounter: 107.9 kg (237 lb 12.8 oz).    Physical Exam  GENERAL: alert and no distress  EYES: Eyes grossly normal to inspection, PERRL and conjunctivae and sclerae normal  HENT: ear canals and TM's normal, nose and mouth without ulcers or lesions  NECK: no adenopathy, no asymmetry, masses, or scars  RESP: lungs clear to auscultation - no rales, rhonchi or wheezes  CV: regular rate and rhythm, normal S1 S2, no S3 or S4, no murmur, click or rub, no peripheral edema  MS: no gross musculoskeletal defects noted, no edema  SKIN: no suspicious lesions or rashes  NEURO: Normal strength and tone, mentation intact and speech normal  PSYCH: mentation appears normal, affect normal/bright        Signed Electronically by: Kaylah Ruiz MD    Answers submitted by the patient for this visit:  Patient Health Questionnaire (Submitted on 3/3/2025)  If you checked off any problems, how difficult have these problems made it for you to do your work, take care of things at home, or get along with other people?: Not difficult at all  PHQ9 TOTAL SCORE: 1    "